# Patient Record
Sex: MALE | Race: WHITE | Employment: OTHER | URBAN - METROPOLITAN AREA
[De-identification: names, ages, dates, MRNs, and addresses within clinical notes are randomized per-mention and may not be internally consistent; named-entity substitution may affect disease eponyms.]

---

## 2023-10-25 PROBLEM — H04.123 BILATERAL DRY EYES: Status: ACTIVE | Noted: 2023-10-25

## 2023-10-25 PROBLEM — H16.223 KERATITIS SICCA, BILATERAL: Status: ACTIVE | Noted: 2023-10-25

## 2023-10-25 PROBLEM — H00.023 INTERNAL HORDEOLUM OF RIGHT EYE: Status: ACTIVE | Noted: 2023-10-25

## 2023-10-25 PROBLEM — H25.13 NUCLEAR SCLEROSIS OF BOTH EYES: Status: ACTIVE | Noted: 2023-10-25

## 2023-10-25 PROBLEM — H00.022 HORDEOLUM INTERNUM OF RIGHT LOWER EYELID: Status: ACTIVE | Noted: 2023-10-25

## 2023-10-25 PROBLEM — H25.013 CORTICAL AGE-RELATED CATARACT OF BOTH EYES: Status: ACTIVE | Noted: 2023-10-25

## 2023-10-25 PROBLEM — H02.831 DERMATOCHALASIS OF RIGHT UPPER EYELID: Status: ACTIVE | Noted: 2023-10-25

## 2023-10-25 PROBLEM — M35.01 KERATITIS SICCA, BILATERAL (MULTI): Status: ACTIVE | Noted: 2023-10-25

## 2023-10-25 PROBLEM — D31.31 CHOROIDAL NEVUS OF RIGHT EYE: Status: ACTIVE | Noted: 2023-10-25

## 2023-10-25 PROBLEM — H01.136: Status: ACTIVE | Noted: 2023-10-25

## 2023-10-25 RX ORDER — LOTEPREDNOL ETABONATE 5 MG/G
1 OINTMENT OPHTHALMIC NIGHTLY
COMMUNITY
Start: 2021-12-03

## 2023-10-25 RX ORDER — LISINOPRIL 20 MG/1
20 TABLET ORAL
COMMUNITY

## 2023-10-25 RX ORDER — DOXAZOSIN 4 MG/1
4 TABLET ORAL
COMMUNITY

## 2023-10-25 RX ORDER — CYCLOSPORINE 0 G/ML
1 SOLUTION/ DROPS OPHTHALMIC; TOPICAL 2 TIMES DAILY
COMMUNITY
Start: 2022-08-25

## 2023-10-26 ENCOUNTER — OFFICE VISIT (OUTPATIENT)
Dept: OPHTHALMOLOGY | Facility: CLINIC | Age: 83
End: 2023-10-26
Payer: MEDICARE

## 2023-10-26 DIAGNOSIS — H01.00A BLEPHARITIS OF UPPER AND LOWER EYELIDS OF BOTH EYES, UNSPECIFIED TYPE: ICD-10-CM

## 2023-10-26 DIAGNOSIS — H25.13 NUCLEAR SCLEROSIS OF BOTH EYES: Primary | ICD-10-CM

## 2023-10-26 DIAGNOSIS — H01.136 ATOPIC DERMATITIS OF LEFT EYELID: ICD-10-CM

## 2023-10-26 DIAGNOSIS — D31.31 CHOROIDAL NEVUS OF RIGHT EYE: ICD-10-CM

## 2023-10-26 DIAGNOSIS — H52.4 HYPEROPIA OF BOTH EYES WITH ASTIGMATISM AND PRESBYOPIA: ICD-10-CM

## 2023-10-26 DIAGNOSIS — H04.123 BILATERAL DRY EYES: ICD-10-CM

## 2023-10-26 DIAGNOSIS — M35.01 KERATITIS SICCA, BILATERAL (MULTI): ICD-10-CM

## 2023-10-26 DIAGNOSIS — H25.013 CORTICAL AGE-RELATED CATARACT OF BOTH EYES: ICD-10-CM

## 2023-10-26 DIAGNOSIS — H02.831 DERMATOCHALASIS OF RIGHT UPPER EYELID: ICD-10-CM

## 2023-10-26 DIAGNOSIS — H52.203 HYPEROPIA OF BOTH EYES WITH ASTIGMATISM AND PRESBYOPIA: ICD-10-CM

## 2023-10-26 DIAGNOSIS — H01.00B BLEPHARITIS OF UPPER AND LOWER EYELIDS OF BOTH EYES, UNSPECIFIED TYPE: ICD-10-CM

## 2023-10-26 DIAGNOSIS — H52.03 HYPEROPIA OF BOTH EYES WITH ASTIGMATISM AND PRESBYOPIA: ICD-10-CM

## 2023-10-26 PROCEDURE — 83861 MICROFLUID ANALY TEARS: CPT | Performed by: OPTOMETRIST

## 2023-10-26 PROCEDURE — 83516 IMMUNOASSAY NONANTIBODY: CPT | Performed by: OPTOMETRIST

## 2023-10-26 PROCEDURE — 92014 COMPRE OPH EXAM EST PT 1/>: CPT | Performed by: OPTOMETRIST

## 2023-10-26 PROCEDURE — 92015 DETERMINE REFRACTIVE STATE: CPT | Performed by: OPTOMETRIST

## 2023-10-26 RX ORDER — PERFLUOROHEXYLOCTANE 1 MG/MG
1 SOLUTION OPHTHALMIC 4 TIMES DAILY
Qty: 3 ML | Refills: 11 | Status: SHIPPED | OUTPATIENT
Start: 2023-10-26 | End: 2024-10-25

## 2023-10-26 ASSESSMENT — CONF VISUAL FIELD
OS_NORMAL: 1
OD_NORMAL: 1
OS_SUPERIOR_NASAL_RESTRICTION: 0
OS_SUPERIOR_TEMPORAL_RESTRICTION: 0
OD_SUPERIOR_NASAL_RESTRICTION: 0
OD_INFERIOR_NASAL_RESTRICTION: 0
OS_INFERIOR_NASAL_RESTRICTION: 0
OD_INFERIOR_TEMPORAL_RESTRICTION: 0
OS_INFERIOR_TEMPORAL_RESTRICTION: 0
OD_SUPERIOR_TEMPORAL_RESTRICTION: 0

## 2023-10-26 ASSESSMENT — ENCOUNTER SYMPTOMS
GASTROINTESTINAL NEGATIVE: 0
ENDOCRINE NEGATIVE: 0
MUSCULOSKELETAL NEGATIVE: 0
CONSTITUTIONAL NEGATIVE: 0
CARDIOVASCULAR NEGATIVE: 0
EYES NEGATIVE: 0
NEUROLOGICAL NEGATIVE: 0
HEMATOLOGIC/LYMPHATIC NEGATIVE: 0
PSYCHIATRIC NEGATIVE: 0
RESPIRATORY NEGATIVE: 0
ALLERGIC/IMMUNOLOGIC NEGATIVE: 0

## 2023-10-26 ASSESSMENT — REFRACTION_MANIFEST
OS_SPHERE: +0.75
OS_ADD: +2.50
OS_CYLINDER: SPHERE
OD_AXIS: 075
OD_SPHERE: +1.00
OD_CYLINDER: -0.75
OD_ADD: +2.50

## 2023-10-26 ASSESSMENT — REFRACTION_WEARINGRX
OS_ADD: +2.50
OS_CYLINDER: SPHERE
OD_ADD: +2.50
OS_SPHERE: +1.00
OD_SPHERE: +0.75
OD_AXIS: 074
OD_CYLINDER: -0.50

## 2023-10-26 ASSESSMENT — VISUAL ACUITY
OS_CC: 20/25
METHOD: SNELLEN - LINEAR
CORRECTION_TYPE: GLASSES
OD_CC: 20/30

## 2023-10-26 ASSESSMENT — EXTERNAL EXAM - LEFT EYE: OS_EXAM: NORMAL

## 2023-10-26 ASSESSMENT — TONOMETRY
IOP_METHOD: GOLDMANN APPLANATION
OS_IOP_MMHG: 19
OD_IOP_MMHG: 16

## 2023-10-26 ASSESSMENT — CUP TO DISC RATIO
OS_RATIO: 0.3
OD_RATIO: 0.3

## 2023-10-26 ASSESSMENT — EXTERNAL EXAM - RIGHT EYE: OD_EXAM: NORMAL

## 2023-10-26 NOTE — PROGRESS NOTES
Testing to evaluate the presence of dry eye disease (DED) was performed today and provided the following results:  The ocular surface disease index questionnaire (OSDI) score was  (scale: 0-12 = normal, 13-22 = mild, 23-32 = moderate, >33 = severe):  21 was 25 was 12  TearLab, a test for tear film osmolarity revealed (normal <300 mOsm/L, mild 300-320, moderate 320-340, severe >340 mOsm/L, inter eye difference of >8 mOsm/L is considered abnormal as well)  Not done today  Inflammadry, a test for the DED specific MMP-9 inhibitor:  OD:  strong positive was strong positive  OS:  strong positive was positive  Schirmer`s test with anesthetic, testing basal tear production (0-5 = severe, 6-10 = moderate, 11-15 = mild):  OD: 15 was 12 was 15 mm between 1-6 minutes  OS:  16 was 5 was 10 mm between 1-6 minutes  Tear break up time (TBUT), a measure of tear stability (0-5 = severe, 6-10 = moderate, 11-15 = mild)  OD: 10 was 10  seconds (excess tearing)  OS:   10 was 10 seconds  except over elevated spot on cornea  excess tearing  Corneal punctate epithelial erosions (PEE) staining with sodium fluorescein score (5 zones, each PEE level 0-3, maximum score = 15)   OD: NIH PEE score:  0 Central PEE absent  OS: NIH PEE score:  0 Central PEE absent  Meibomian gland dysfunction (Efron scale: 0 to 4 with 0 indicating fully functional Meibomian glands and 4 indicating the severest level of plugged glands as well as eyelid margin telangiectasia/erythema):  OD: 2-3 with  atopic thickening  OS: 2-3 with atopic thickening    Has DC`d Restasis and Cequa due to burning but now has reflex tearing.  Pt recommended to resume.  Using Lotemax lucy PRN, WC`s BID.  Rx for Lotemax lucy sent to [facility].  CN OD is stable.  NS and ACC stable.  VA 20/25 OD and OS and midly reduced due to cataracts.  Hx of OCT RNFL and normal. ptosis OD (pt exhibits severe brow rescue.  Pt advised that droopy lid is not due to inflammation.  Skin looks excellent.  Minor  atopic wrinkling and no flaking.  Small neoplasm skin left lower lid.  Nevus per Dr Venegas did not feel biopsy/remove is needed.  No need for blepharoplasty at this point.   Start Miebo QID. Rx sent in.   1 year for TS for DFE and MR and dry eye testing.

## 2024-12-10 ENCOUNTER — DOCUMENTATION (OUTPATIENT)
Dept: OPHTHALMOLOGY | Facility: CLINIC | Age: 84
End: 2024-12-10
Payer: MEDICARE

## 2024-12-10 DIAGNOSIS — H16.223 KERATITIS SICCA, BILATERAL: Primary | ICD-10-CM

## 2024-12-10 RX ORDER — PERFLUOROHEXYLOCTANE 1 MG/MG
1 SOLUTION OPHTHALMIC 4 TIMES DAILY
Qty: 3 ML | Refills: 11 | Status: SHIPPED | OUTPATIENT
Start: 2024-12-10 | End: 2025-01-09

## 2025-02-21 ENCOUNTER — APPOINTMENT (OUTPATIENT)
Dept: OPHTHALMOLOGY | Age: 85
End: 2025-02-21
Payer: MEDICARE

## 2025-04-18 ENCOUNTER — APPOINTMENT (OUTPATIENT)
Dept: OPHTHALMOLOGY | Age: 85
End: 2025-04-18
Payer: MEDICARE

## 2025-04-18 DIAGNOSIS — H52.03 HYPEROPIA OF BOTH EYES WITH ASTIGMATISM AND PRESBYOPIA: ICD-10-CM

## 2025-04-18 DIAGNOSIS — H01.00B BLEPHARITIS OF UPPER AND LOWER EYELIDS OF BOTH EYES, UNSPECIFIED TYPE: ICD-10-CM

## 2025-04-18 DIAGNOSIS — H16.223 KERATITIS SICCA, BILATERAL: ICD-10-CM

## 2025-04-18 DIAGNOSIS — H01.00A BLEPHARITIS OF UPPER AND LOWER EYELIDS OF BOTH EYES, UNSPECIFIED TYPE: ICD-10-CM

## 2025-04-18 DIAGNOSIS — H52.203 HYPEROPIA OF BOTH EYES WITH ASTIGMATISM AND PRESBYOPIA: ICD-10-CM

## 2025-04-18 DIAGNOSIS — D31.31 CHOROIDAL NEVUS OF RIGHT EYE: ICD-10-CM

## 2025-04-18 DIAGNOSIS — H25.13 NUCLEAR SCLEROSIS OF BOTH EYES: Primary | ICD-10-CM

## 2025-04-18 DIAGNOSIS — H52.4 HYPEROPIA OF BOTH EYES WITH ASTIGMATISM AND PRESBYOPIA: ICD-10-CM

## 2025-04-18 DIAGNOSIS — H04.123 BILATERAL DRY EYES: ICD-10-CM

## 2025-04-18 RX ORDER — FINASTERIDE 5 MG/1
5 TABLET, FILM COATED ORAL
COMMUNITY
Start: 2025-02-05

## 2025-04-18 RX ORDER — PERFLUOROHEXYLOCTANE 1 MG/MG
1 SOLUTION OPHTHALMIC 4 TIMES DAILY
COMMUNITY
Start: 2024-01-07

## 2025-04-18 RX ORDER — SENNOSIDES 8.6 MG/1
1 TABLET ORAL NIGHTLY
COMMUNITY
Start: 2024-07-23

## 2025-04-18 RX ORDER — ASPIRIN 325 MG
325 TABLET, DELAYED RELEASE (ENTERIC COATED) ORAL
COMMUNITY

## 2025-04-18 RX ORDER — NEOMYCIN SULFATE, POLYMYXIN B SULFATE, AND DEXAMETHASONE 3.5; 10000; 1 MG/G; [USP'U]/G; MG/G
OINTMENT OPHTHALMIC
COMMUNITY
Start: 2024-04-23

## 2025-04-18 RX ORDER — VIT C/E/ZN/COPPR/LUTEIN/ZEAXAN 250MG-90MG
CAPSULE ORAL
COMMUNITY

## 2025-04-18 RX ORDER — HYDROCHLOROTHIAZIDE 12.5 MG/1
12.5 CAPSULE ORAL
COMMUNITY
Start: 2024-12-03

## 2025-04-18 ASSESSMENT — REFRACTION_MANIFEST
OS_SPHERE: NO TARGET
OD_AXIS: 082
OD_ADD: +2.50
OS_CYLINDER: -0.25
OD_SPHERE: -0.75
METHOD_AUTOREFRACTION: 1
OS_AXIS: 040
OS_CYLINDER: SPHERE
OD_CYLINDER: -1.00
OS_SPHERE: +1.50
OS_ADD: +2.50
OD_AXIS: 070
OD_SPHERE: -1.25
OD_CYLINDER: -0.50

## 2025-04-18 ASSESSMENT — ENCOUNTER SYMPTOMS
CONSTITUTIONAL NEGATIVE: 0
ENDOCRINE NEGATIVE: 0
MUSCULOSKELETAL NEGATIVE: 0
GASTROINTESTINAL NEGATIVE: 0
RESPIRATORY NEGATIVE: 0
HEMATOLOGIC/LYMPHATIC NEGATIVE: 0
NEUROLOGICAL NEGATIVE: 0
CARDIOVASCULAR NEGATIVE: 0
EYES NEGATIVE: 1
ALLERGIC/IMMUNOLOGIC NEGATIVE: 0
PSYCHIATRIC NEGATIVE: 0

## 2025-04-18 ASSESSMENT — VISUAL ACUITY
METHOD: SNELLEN - LINEAR
OD_CC+: -1
OD_CC: 20/70
CORRECTION_TYPE: GLASSES
OS_CC+: +1
OS_BAT_HIGH: 20/100
OS_CC: 20/70
OD_BAT_HIGH: 20/100

## 2025-04-18 ASSESSMENT — CONF VISUAL FIELD
OD_INFERIOR_NASAL_RESTRICTION: 0
OD_NORMAL: 1
OD_SUPERIOR_NASAL_RESTRICTION: 0
OD_SUPERIOR_TEMPORAL_RESTRICTION: 0
OS_INFERIOR_NASAL_RESTRICTION: 0
OS_SUPERIOR_TEMPORAL_RESTRICTION: 0
OS_NORMAL: 1
OD_INFERIOR_TEMPORAL_RESTRICTION: 0
OS_SUPERIOR_NASAL_RESTRICTION: 0
OS_INFERIOR_TEMPORAL_RESTRICTION: 0
METHOD: COUNTING FINGERS

## 2025-04-18 ASSESSMENT — SCHIRMERS TEST
OD_SCHIRMERS: 10
OS_SCHIRMERS: 9

## 2025-04-18 ASSESSMENT — REFRACTION_WEARINGRX
OD_CYLINDER: -0.75
OD_ADD: +2.50
OD_SPHERE: +1.00
OD_AXIS: 075
OS_CYLINDER: SPHERE
OS_ADD: +2.50
OS_SPHERE: +0.75

## 2025-04-18 ASSESSMENT — TONOMETRY
OS_IOP_MMHG: 17
IOP_METHOD: GOLDMANN APPLANATION
OD_IOP_MMHG: 16

## 2025-04-18 ASSESSMENT — EXTERNAL EXAM - LEFT EYE: OS_EXAM: NORMAL

## 2025-04-18 ASSESSMENT — EXTERNAL EXAM - RIGHT EYE: OD_EXAM: NORMAL

## 2025-04-18 ASSESSMENT — CUP TO DISC RATIO
OD_RATIO: 0.3
OS_RATIO: 0.3

## 2025-04-18 NOTE — PROGRESS NOTES
Testing to evaluate the presence of dry eye disease (DED) was performed today and provided the following results:  The ocular surface disease index questionnaire (OSDI) score was  (scale: 0-12 = normal, 13-22 = mild, 23-32 = moderate, >33 = severe):  42 was 21 was 25 was 12  TearLab, a test for tear film osmolarity revealed (normal <300 mOsm/L, mild 300-320, moderate 320-340, severe >340 mOsm/L, inter eye difference of >8 mOsm/L is considered abnormal as well)       Inflammadry, a test for the DED specific MMP-9 inhibitor:  OD:  pos was strong positive was strong positive  OS:  pos was strong positive was positive  Schirmer`s test with anesthetic, testing basal tear production (0-5 = severe, 6-10 = moderate, 11-15 = mild):  OD: 10 was 15 was 12 was 15 mm between 1-6 minutes  OS:  10 was 16 was 5 was 10 mm between 1-6 minutes  Tear break up time (TBUT), a measure of tear stability (0-5 = severe, 6-10 = moderate, 11-15 = mild)  OD: 10 was 10  seconds (excess tearing)  OS:   10 was 10 seconds  except over elevated spot on cornea  excess tearing  Corneal punctate epithelial erosions (PEE) staining with sodium fluorescein score (5 zones, each PEE level 0-3, maximum score = 15)   OD: NIH PEE score:  0 Central PEE absent  OS: NIH PEE score:  0 Central PEE absent  Meibomian gland dysfunction (Efron scale: 0 to 4 with 0 indicating fully functional Meibomian glands and 4 indicating the severest level of plugged glands as well as eyelid margin telangiectasia/erythema):  OD: 2-3 with  atopic thickening  OS: 2-3 with atopic thickening    Blepharitis: Meibomain gland disease (Effron scale 0-4, 0:no abnormality, 4: thick creamy yellow expression at all gland orifices, expression continuous, conjunctival redness). Collarettes (0-4, 0: 0-2 lashes with collarettes, 1: 3-10, 2: >10 but <1/3rd of lashes, 3: >1/3rd to <2/3rd of lashes, 4: >2/3rd of lashes.  Right eye (OD): stage 2 collarettes: stage 0  Left eye (OS): stage 2  collarettes: stage 0     Has DC`d Restasis and Cequa due to burning but now has reflex tearing.  Pt recommended to resume.  Using Lotemax lucy PRN, WC`s BID.  Rx for Lotemax lucy sent to [facility].  CN OD is stable.  NS and ACC stable.  VA 20/25 OD and OS and midly reduced due to cataracts.  Hx of OCT RNFL and normal. ptosis OD (pt exhibits severe brow rescue.  Pt advised that droopy lid is not due to inflammation.  Skin looks excellent.  Minor atopic wrinkling and no flaking.  Small neoplasm skin left lower lid.  Nevus per Dr Venegas did not feel biopsy/remove is needed.  No need for blepharoplasty at this point.     Meibo TID OU and OTC AFTs PRN. Notes frequent irritation of eyelids and surrounding skin. Has a steroid cream prescribed by his dermatologist that he uses once per week. Has Lotemax gel gtts that he uses as needed, states he uses very infrequently. Does not perform warm compresses or eyelid hygiene because it irritates the skin    Cataracts OU. BCVA 20/50 OD/OS, BAT 20/100 OD/OS.     1 year for full exam with TS. Deferred spectacle Rx today as VA can not be corrected with new glasses.    Continue current dry eye regimen (Miebo PFATs and steroid lucy for eyelid skin) NOTE: no demodex blepharitis no collarettes notd. Referred to Dr Weeks for cataract pre-op testing.

## 2025-04-28 ENCOUNTER — APPOINTMENT (OUTPATIENT)
Dept: OPHTHALMOLOGY | Age: 85
End: 2025-04-28
Payer: MEDICARE

## 2025-04-28 ENCOUNTER — PREP FOR PROCEDURE (OUTPATIENT)
Dept: OPHTHALMOLOGY | Facility: CLINIC | Age: 85
End: 2025-04-28

## 2025-04-28 DIAGNOSIS — H25.811 COMBINED FORM OF AGE-RELATED CATARACT, RIGHT EYE: Primary | ICD-10-CM

## 2025-04-28 DIAGNOSIS — H25.13 NUCLEAR SCLEROSIS OF BOTH EYES: Primary | ICD-10-CM

## 2025-04-28 DIAGNOSIS — H52.213 IRREGULAR ASTIGMATISM, BILATERAL: ICD-10-CM

## 2025-04-28 DIAGNOSIS — H25.812 COMBINED FORM OF AGE-RELATED CATARACT, LEFT EYE: ICD-10-CM

## 2025-04-28 RX ORDER — TETRACAINE HYDROCHLORIDE 5 MG/ML
1 SOLUTION OPHTHALMIC ONCE
OUTPATIENT
Start: 2025-04-28 | End: 2025-04-28

## 2025-04-28 RX ORDER — CYCLOPENTOLATE HYDROCHLORIDE 10 MG/ML
1 SOLUTION/ DROPS OPHTHALMIC
OUTPATIENT
Start: 2025-04-28 | End: 2025-04-28

## 2025-04-28 RX ORDER — PHENYLEPHRINE HYDROCHLORIDE 25 MG/ML
1 SOLUTION/ DROPS OPHTHALMIC
OUTPATIENT
Start: 2025-04-28 | End: 2025-04-28

## 2025-04-28 RX ORDER — TROPICAMIDE 10 MG/ML
1 SOLUTION/ DROPS OPHTHALMIC
OUTPATIENT
Start: 2025-04-28 | End: 2025-04-28

## 2025-04-28 ASSESSMENT — CONF VISUAL FIELD
OD_SUPERIOR_NASAL_RESTRICTION: 0
OD_NORMAL: 1
OS_SUPERIOR_TEMPORAL_RESTRICTION: 0
OS_INFERIOR_TEMPORAL_RESTRICTION: 0
OD_SUPERIOR_TEMPORAL_RESTRICTION: 0
OS_NORMAL: 1
OS_INFERIOR_NASAL_RESTRICTION: 0
OS_SUPERIOR_NASAL_RESTRICTION: 0
OD_INFERIOR_TEMPORAL_RESTRICTION: 0
OD_INFERIOR_NASAL_RESTRICTION: 0

## 2025-04-28 ASSESSMENT — CUP TO DISC RATIO
OS_RATIO: 0.3
OD_RATIO: 0.3

## 2025-04-28 ASSESSMENT — REFRACTION_WEARINGRX
OD_AXIS: 075
OS_CYLINDER: SPHERE
OD_ADD: +2.50
OS_SPHERE: +0.75
OD_SPHERE: +1.00
OS_ADD: +2.50
OD_CYLINDER: -0.75

## 2025-04-28 ASSESSMENT — ENCOUNTER SYMPTOMS
PSYCHIATRIC NEGATIVE: 0
MUSCULOSKELETAL NEGATIVE: 0
GASTROINTESTINAL NEGATIVE: 0
ENDOCRINE NEGATIVE: 0
ALLERGIC/IMMUNOLOGIC NEGATIVE: 0
HEMATOLOGIC/LYMPHATIC NEGATIVE: 0
EYES NEGATIVE: 1
RESPIRATORY NEGATIVE: 0
CARDIOVASCULAR NEGATIVE: 0
CONSTITUTIONAL NEGATIVE: 0
NEUROLOGICAL NEGATIVE: 0

## 2025-04-28 ASSESSMENT — VISUAL ACUITY
METHOD: SNELLEN - LINEAR
CORRECTION_TYPE: GLASSES
OS_PH_CC: 20/50
OD_PH_CC: 20/50
OD_BAT_MED: 20/100
OS_CC: 20/70
OD_PH_CC+: -1
OD_CC: 20/70
OS_BAT_MED: 20/100

## 2025-04-28 ASSESSMENT — REFRACTION_MANIFEST
OS_AXIS: 040
OD_AXIS: 070
OS_CYLINDER: -0.25
OS_ADD: +2.50
OD_SPHERE: -1.25
OD_CYLINDER: -1.00
OS_SPHERE: +1.50
OD_ADD: +2.50

## 2025-04-28 ASSESSMENT — EXTERNAL EXAM - LEFT EYE: OS_EXAM: NORMAL

## 2025-04-28 ASSESSMENT — TONOMETRY
IOP_METHOD: GOLDMANN APPLANATION
OD_IOP_MMHG: 16
OS_IOP_MMHG: 17

## 2025-04-28 ASSESSMENT — EXTERNAL EXAM - RIGHT EYE: OD_EXAM: NORMAL

## 2025-04-28 NOTE — PROGRESS NOTES
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 Pt would like to proceed with surgery.    Visually significant cataract OD. BCVA: 20/50. Glare: 20/100. Symptoms: blurry vision, glare. A change in glasses prescription will not result in significant visual improvement at this time.  Indication for cataract surgery: Input To potentially improve visual acuity and improve quality of life/reduce symptoms.   Based on a comprehensive eye exam performed today, a visually significant cataract appears to be the source of decreased vision, diminished quality of life, and impairment of activities of daily living. Discussed option of cataract surgery vs observation. Patient can no longer function adequately with current best corrected visual acuity and wishes to have cataract surgery at this time. Discussed surgical procedure with patient. As a result of cataract extraction, it is believed that the patient will experience improved vision. Discussed potential risks, benefits, and complications of cataract surgery including but not limited to pain, bleeding, infection, inflammation, edema, increased eye pressure, retinal tear/detachment, lens dislocation, ptosis, iris damage, need for additional surgery, need for glasses after surgery, loss of vision/loss of eye. Patient understands and wishes to proceed. All questions were answered. Will schedule cataract surgery OD. Lenstar done today.  Discussed IOL options (standard monofocal, monofocal with monovision, toric, multifocal). Lens chosen: standard monofocal. Defer/decline toric/multifocal lens at this time. Had thorough discussion with patient re: aim. Discussed that may potentially need glasses for best vision both at distance and at near.    Schedule cataract surgery OD  I personally reviewed the lenstar measurements  and will choose the lens accordingly.     Combined form of age-related cataract, left eyeH25.812  Visually significant. Pt would like to proceed with surgery.    Visually significant cataract OS. BCVA: 20/50. Glare: 20/100. Symptoms: blurry vision, glare. A change in glasses prescription will not result in significant visual improvement at this time.  Indication for cataract surgery: Input To potentially improve visual acuity and improve quality of life/reduce symptoms.   Based on a comprehensive eye exam performed today, a visually significant cataract appears to be the source of decreased vision, diminished quality of life, and impairment of activities of daily living. Discussed option of cataract surgery vs observation. Patient can no longer function adequately with current best corrected visual acuity and wishes to have cataract surgery at this time. Discussed surgical procedure with patient. As a result of cataract extraction, it is believed that the patient will experience improved vision. Discussed potential risks, benefits, and complications of cataract surgery including but not limited to pain, bleeding, infection, inflammation, edema, increased eye pressure, retinal tear/detachment, lens dislocation, ptosis, iris damage, need for additional surgery, need for glasses after surgery, loss of vision/loss of eye. Patient understands and wishes to proceed. All questions were answered. Will schedule cataract surgery OS. Lenstar done today.   Discussed IOL options (standard monofocal, monofocal with monovision, toric, multifocal). Lens chosen: standard monofocal. Defer/decline toric/multifocal lens at this time. Had thorough discussion with patient re: aim. Discussed that may potentially need glasses for best vision both at distance and at near.     Schedule cataract surgery OS  I personally reviewed the lenstar measurements and will choose the lens accordingly.

## 2025-04-28 NOTE — H&P
History Of Present Illness  Renato Griffiths is a 84 y.o. male presenting with blurred vision.     Past Medical History  He has no past medical history on file.    Surgical History  He has no past surgical history on file.     Social History  He has no history on file for tobacco use, alcohol use, and drug use.     Allergies  Patient has no known allergies.    ROS  Patient denies ocular pain, redness, discharge, decreased vision, double vision, blind spots, flashes, or floaters.     Physical Exam  Not recorded          Assessment/Plan   Diagnoses and all orders for this visit:  Combined form of age-related cataract, right eye  -     Case Request Operating Room: Cataract extraction with intraocular lens implantation; Standing  Combined form of age-related cataract, left eye  -     Case Request Operating Room: Cataract extraction with intraocular lens implantation; Standing  Other orders  -     Place in outpatient/hospital ambulatory surgery; Standing  -     Full code; Standing  -     NPO Diet Except: Sips with meds; Effective now; Standing  -     Height and weight; Standing  -     Insert and maintain peripheral IV; Standing  -     Saline lock IV; Standing  -     Type And Screen; Standing  -     tetracaine (Altacaine) 0.5 % ophthalmic solution 1 drop  -     cyclopentolate (Cyclogyl) 1 % ophthalmic solution 1 drop  -     phenylephrine (Mydfrin) 2.5 % ophthalmic solution 1 drop  -     tropicamide (Mydriacyl) 1 % ophthalmic solution 1 drop  -     Full code; Standing  -     NPO Diet Except: Sips with meds; Effective now; Standing  -     Height and weight; Standing  -     Insert and maintain peripheral IV; Standing  -     Saline lock IV; Standing  -     Type And Screen; Standing  -     tetracaine (Altacaine) 0.5 % ophthalmic solution 1 drop  -     cyclopentolate (Cyclogyl) 1 % ophthalmic solution 1 drop  -     phenylephrine (Mydfrin) 2.5 % ophthalmic solution 1 drop  -     tropicamide (Mydriacyl) 1 % ophthalmic solution 1  drop           I spent 30 minutes in the professional and overall care of this patient.      Pat Weeks MD

## 2025-07-02 ENCOUNTER — APPOINTMENT (OUTPATIENT)
Dept: OPHTHALMOLOGY | Facility: CLINIC | Age: 85
End: 2025-07-02
Payer: MEDICARE

## 2025-07-16 ENCOUNTER — PRE-ADMISSION TESTING (OUTPATIENT)
Dept: PREADMISSION TESTING | Facility: HOSPITAL | Age: 85
End: 2025-07-16
Payer: MEDICARE

## 2025-07-16 VITALS
RESPIRATION RATE: 18 BRPM | DIASTOLIC BLOOD PRESSURE: 71 MMHG | SYSTOLIC BLOOD PRESSURE: 156 MMHG | TEMPERATURE: 95.7 F | HEIGHT: 69 IN | BODY MASS INDEX: 26.78 KG/M2 | OXYGEN SATURATION: 98 % | WEIGHT: 180.78 LBS | HEART RATE: 71 BPM

## 2025-07-16 DIAGNOSIS — I10 PRIMARY HYPERTENSION: ICD-10-CM

## 2025-07-16 DIAGNOSIS — Z01.818 ENCOUNTER FOR PREADMISSION TESTING: Primary | ICD-10-CM

## 2025-07-16 DIAGNOSIS — N40.0 BENIGN PROSTATIC HYPERPLASIA, UNSPECIFIED WHETHER LOWER URINARY TRACT SYMPTOMS PRESENT: ICD-10-CM

## 2025-07-16 DIAGNOSIS — H25.813 COMBINED FORM OF AGE-RELATED CATARACT, BOTH EYES: ICD-10-CM

## 2025-07-16 PROCEDURE — 99204 OFFICE O/P NEW MOD 45 MIN: CPT | Performed by: NURSE PRACTITIONER

## 2025-07-16 ASSESSMENT — DUKE ACTIVITY SCORE INDEX (DASI)
CAN YOU DO MODERATE WORK AROUND THE HOUSE LIKE VACUUMING, SWEEPING FLOORS OR CARRYING GROCERIES: YES
CAN YOU DO LIGHT WORK AROUND THE HOUSE LIKE DUSTING OR WASHING DISHES: YES
CAN YOU CLIMB A FLIGHT OF STAIRS OR WALK UP A HILL: YES
TOTAL_SCORE: 37.45
CAN YOU PARTICIPATE IN STRENOUS SPORTS LIKE SWIMMING, SINGLES TENNIS, FOOTBALL, BASKETBALL, OR SKIING: NO
CAN YOU HAVE SEXUAL RELATIONS: NO
CAN YOU WALK INDOORS, SUCH AS AROUND YOUR HOUSE: YES
CAN YOU RUN A SHORT DISTANCE: NO
CAN YOU TAKE CARE OF YOURSELF (EAT, DRESS, BATHE, OR USE TOILET): YES
CAN YOU WALK A BLOCK OR TWO ON LEVEL GROUND: YES
CAN YOU PARTICIPATE IN MODERATE RECREATIONAL ACTIVITIES LIKE GOLF, BOWLING, DANCING, DOUBLES TENNIS OR THROWING A BASEBALL OR FOOTBALL: YES
CAN YOU DO YARD WORK LIKE RAKING LEAVES, WEEDING OR PUSHING A MOWER: YES
DASI METS SCORE: 7.3
CAN YOU DO HEAVY WORK AROUND THE HOUSE LIKE SCRUBBING FLOORS OR LIFTING AND MOVING HEAVY FURNITURE: YES

## 2025-07-16 ASSESSMENT — PAIN SCALES - GENERAL: PAINLEVEL_OUTOF10: 0 - NO PAIN

## 2025-07-16 ASSESSMENT — PAIN - FUNCTIONAL ASSESSMENT: PAIN_FUNCTIONAL_ASSESSMENT: 0-10

## 2025-07-16 NOTE — PREPROCEDURE INSTRUCTIONS
Medication List            Accurate as of July 16, 2025 12:05 PM. Always use your most recent med list.                aspirin 325 mg EC tablet  Medication Adjustments for Surgery: Take/Use as prescribed     Cequa 0.09 % dropperette  Generic drug: cycloSPORINE  Medication Adjustments for Surgery: Take/Use as prescribed     cyanocobalamin (vitamin B-12) 2,500 mcg tablet, sublingual SL tablet  Medication Adjustments for Surgery: Take/Use as prescribed     doxazosin 4 mg tablet  Commonly known as: Cardura  Medication Adjustments for Surgery: Take/Use as prescribed     finasteride 5 mg tablet  Commonly known as: Proscar  Medication Adjustments for Surgery: Take/Use as prescribed     hydroCHLOROthiazide 12.5 mg capsule  Commonly known as: Microzide  Medication Adjustments for Surgery: Take/Use as prescribed     lisinopril 20 mg tablet  Medication Adjustments for Surgery: Take last dose 1 day (24 hours) before surgery     Lotemax 0.5 % ointment  Generic drug: loteprednol etabonate     Miebo (PF) 100 % drops  Generic drug: perfluorohexyloctane (PF)     neomycin-polymyxin B-dexameth 3.5 mg/g-10,000 unit/g-0.1 % ointment ophthalmic ointment  Commonly known as: Polydex     sennosides 8.6 mg tablet  Commonly known as: Senokot  Medication Adjustments for Surgery: Take/Use as prescribed                              NPO Instructions:    Do not eat any food after midnight the night before your surgery/procedure.    Additional Instructions:     Day of Surgery:  You may have clear liquids until TWO hours before surgery/procedure.  This includes water, black tea/coffee, (no milk or cream) apple juice and electrolyte drinks (Gatorade)  Wear  comfortable loose fitting clothing  Do not use moisturizers, creams, lotions or perfume  All jewelry and valuables should be left at home      PRE-OPERATIVE INSTRUCTIONS FOR SURGERY    *Do not eat anything after midnight the night of surgery.  This includes food of any kind (including hard  candy, cough drops, mints).     You may have up to 13 ounces of clear liquid until TWO hours prior to your scheduled arrival time  Clear liquids include water, black tea/coffee, (no milk or cream) apple juice and electrolyte drinks (GATORADE).  You may chew gum until TWO hours prior you your surgery/procedure.     *One of our staff members will call you ONE business day before  your surgery, between 11am-2 pm to let you know the time to arrive.  If you have not received a call by 2 pm, call [phone] This is the surgery schedulers that will call you with your arrival time    *When you arrive at the hospital-->GO TO Registration on the ground floor  *Stop smoking 24 hours prior to surgery.  No Marijuana, CBD Oil or Vaping for 48 hours  *No alcohol 24 hours prior to surgery  *You will need a responsible adult to drive you home  -No acrylic nails or nail polish on at least one fingernail, NO polish on toes for foot surgery  -You may be asked to remove your dentures, partial plate, eyeglasses or contact lenses before going to surgery.  Please bring a case for these items.  -Body piercings need to be removed.  Jewelry and valuables should be left at home.  -Put on loose,  comfortable, clean clothing, that will accommodate bandages    *If you have any further questions about your pre-op instructions,  not mentioned in this handout, then call [phone]* This is the nurse line

## 2025-07-16 NOTE — CPM/PAT H&P
CPM/PAT Evaluation       Name: Renato Griffiths (Renato Griffiths)  /Age: 1940/84 y.o.     In-Person       Chief Complaint: PAT for planned cataract surgery    84 yr old male w/PHx of HTN, BPH and combined form of age-related cataract of both eyes referred to PAT for planned Right cataract extraction w/intraocular lens implantation w/Dr Weeks on 2025 and Left cataract extraction w/intraocular lens implantation w/Dr Weeks on 2025     Patient reports feeling overall well, denies fever, cough or recent infection. Denies hx of stroke, seizure or blood clot.  Reports remaining otherwise physically active enjoying hiking and line dancing; denies cardiac or respiratory symptoms. Past surgical hx includes cystoscopy and colonoscopy many years ago; denies past issues with anesthesia.      Followed by PCP (Annel Shea MD) 2025  Followed by urology (Jonathan Méndez MD) 2025        Medical History[1]    Surgical History[2]    Patient Sexual activity questions deferred to the physician.    Family History[3]    Allergies[4]    Prior to Admission medications   Medication Sig Start Date End Date Taking? Authorizing Provider   aspirin 325 mg EC tablet Take 1 tablet (325 mg) by mouth.    Historical Provider, MD   cyanocobalamin, vitamin B-12, 2,500 mcg tablet, sublingual SL tablet Place under the tongue.    Historical Provider, MD   cycloSPORINE (Cequa) 0.09 % dropperette Administer 1 drop into affected eye(s) 2 times a day.  Patient not taking: Reported on 2025   Historical Provider, MD   doxazosin (Cardura) 4 mg tablet Take 1 tablet (4 mg) by mouth.    Historical Provider, MD   finasteride (Proscar) 5 mg tablet Take 1 tablet (5 mg) by mouth once daily. 25   Historical Provider, MD   hydroCHLOROthiazide (Microzide) 12.5 mg capsule Take 1 capsule (12.5 mg) by mouth once daily. 12/3/24   Historical Provider, MD   lisinopril 20 mg tablet Take 1 tablet (20 mg) by mouth.    Historical  "Provider, MD   loteprednol (Lotemax) 0.5 % ophthalmic gel APPLY 1 STRIP IN BOTH EYES AT BEDTIME 11/2/23   Miguel Garcia, YESSICA   loteprednol etabonate (Lotemax) 0.5 % ointment Apply 1 Application to affected eye(s) once daily at bedtime. 12/3/21   Historical Provider, MD Jon PF, 100 % drops Administer 1 drop into affected eye(s) 4 times a day. 1/7/24   Historical Provider, MD   neomycin-polymyxin B-dexameth (Polydex) 3.5 mg/g-10,000 unit/g-0.1 % ointment ophthalmic ointment APPLY THIN LAYER TO THE EYELID TWICE DAILY AS NEEDED FOR FLARES 4/23/24   Historical Provider, MD   sennosides (Senokot) 8.6 mg tablet Take 1 tablet (8.6 mg) by mouth once daily at bedtime. 7/23/24   Historical Provider, MD        A ten-point review of systems was completed and is otherwise negative except for what is mentioned in the HPI above.    Physical Exam  Vitals reviewed.   HENT:      Head: Normocephalic.     Eyes:      Pupils: Pupils are equal, round, and reactive to light.      Comments: Corrective lenses in use     Cardiovascular:      Rate and Rhythm: Normal rate and regular rhythm.      Pulses: Normal pulses.   Pulmonary:      Effort: Pulmonary effort is normal.      Breath sounds: Normal breath sounds.   Abdominal:      General: Bowel sounds are normal.     Musculoskeletal:         General: Normal range of motion.      Cervical back: Normal range of motion.     Skin:     General: Skin is warm and dry.     Neurological:      Mental Status: He is alert and oriented to person, place, and time.     Psychiatric:         Mood and Affect: Mood normal.          PAT AIRWAY:   Airway:     Mallampati::  I    TM distance::  >3 FB    Neck ROM::  Full  normal        Testing/Diagnostic: CMP    Patient Specialist/PCP: Annel Shea MD (PCP) 6/20/2025; Jonathan Méndez MD (urology) 6/19/2025    Visit Vitals  /71   Pulse 71   Temp 35.4 °C (95.7 °F) (Tympanic)   Resp 18   Ht 1.753 m (5' 9\")   Wt 82 kg (180 lb 12.4 oz)   SpO2 98%   BMI " 26.70 kg/m²   Smoking Status Former   BSA 2 m²       DASI Risk Score      Flowsheet Row Pre-Admission Testing from 7/16/2025 in [facility]   Can you take care of yourself (eat, dress, bathe, or use toilet)?  2.75 filed at 07/16/2025 1047   Can you walk indoors, such as around your house? 1.75 filed at 07/16/2025 1047   Can you walk a block or two on level ground?  2.75 filed at 07/16/2025 1047   Can you climb a flight of stairs or walk up a hill? 5.5 filed at 07/16/2025 1047   Can you run a short distance? 0 filed at 07/16/2025 1047   Can you do light work around the house like dusting or washing dishes? 2.7 filed at 07/16/2025 1047   Can you do moderate work around the house like vacuuming, sweeping floors or carrying groceries? 3.5 filed at 07/16/2025 1047   Can you do heavy work around the house like scrubbing floors or lifting and moving heavy furniture?  8 filed at 07/16/2025 1047   Can you do yard work like raking leaves, weeding or pushing a mower? 4.5 filed at 07/16/2025 1047   Can you have sexual relations? 0 filed at 07/16/2025 1047   Can you participate in moderate recreational activities like golf, bowling, dancing, doubles tennis or throwing a baseball or football? 6 filed at 07/16/2025 1047   Can you participate in strenous sports like swimming, singles tennis, football, basketball, or skiing? 0 filed at 07/16/2025 1047   DASI SCORE 37.45 filed at 07/16/2025 1047   METS Score (Will be calculated only when all the questions are answered) 7.3 filed at 07/16/2025 1047     Caprini DVT Assessment    No data to display  Modified Frailty Index    No data to display  [id number] Stroke Risk Points  Current as of just now        N/A 0 to 9 Points:      Last Change: N/A          The [id number] risk score (Lip YOHANA, et al. 2009. © 2010 [employer]) quantifies the risk of stroke for a patient with atrial fibrillation. For patients without atrial fibrillation or under the age  of 18 this score appears as N/A. Higher score values generally indicate higher risk of stroke.        This score is not applicable to this patient. Components are not calculated.          Revised Cardiac Risk Index    No data to display  Apfel Simplified Score    No data to display  Risk Analysis Index Results This Encounter    No data found in the last 10 encounters.       Stop Bang Score      Flowsheet Row Pre-Admission Testing from 7/16/2025 in [facility]   Do you snore loudly? 0 filed at 07/16/2025 1152   Do you often feel tired or fatigued after your sleep? 0 filed at 07/16/2025 1152   Has anyone ever observed you stop breathing in your sleep? 0 filed at 07/16/2025 1152   Do you have or are you being treated for high blood pressure? 1 filed at 07/16/2025 1152   Recent BMI (Calculated) 26.7 filed at 07/16/2025 1152   Is BMI greater than 35 kg/m2? 0=No filed at 07/16/2025 1152   Age older than 50 years old? 1=Yes filed at 07/16/2025 1152   Is your neck circumference greater than 17 inches (Male) or 16 inches (Female)? 0 filed at 07/16/2025 1152   Gender - Male 1=Yes filed at 07/16/2025 1152   STOP-BANG Total Score 3 filed at 07/16/2025 1152     Prodigy: High Risk  Total Score: 24              Prodigy Age Score      Prodigy Gender Score          ARISCAT Score for Postoperative Pulmonary Complications      Flowsheet Row Pre-Admission Testing from 7/16/2025 in [facility]   Age Calculated Score 16 filed at 07/16/2025 1243   Preoperative SpO2 0 filed at 07/16/2025 1243   Respiratory infection in the last month Either upper or lower (i.e., URI, bronchitis, pneumonia), with fever and antibiotic treatment 0 filed at 07/16/2025 1243   Preoperative anemia (Hgb less than 10 g/dl) 0 filed at 07/16/2025 1243   Surgical incision  0 filed at 07/16/2025 1243   Duration of surgery  0 filed at 07/16/2025 1243   Emergency Procedure  0 filed at 07/16/2025 1243   ARISCAT Total Score  16 filed at 07/16/2025  1243     Ronan Perioperative Risk for Myocardial Infarction or Cardiac Arrest (SEGUN)      Flowsheet Row Pre-Admission Testing from 7/16/2025 in [facility]   Calculated Age Score 1.68 filed at 07/16/2025 1243   Functional Status  0 filed at 07/16/2025 1243   ASA Class  -3.29 filed at 07/16/2025 1243   Creatinine 0 filed at 07/16/2025 1243   Type of Procedure  0.71  [cataract surgery] filed at 07/16/2025 1243   SEGUN Total Score  -6.15 filed at 07/16/2025 1243   SEGUN % 0.21 filed at 07/16/2025 1243       Assessment and Plan:     # HTN - continue cardura, hctz, hold lisinopril 24 hrs before surgery  # BPH - continue finasteride; followed by urology   # Combined form of age-related cataract - Right cataract extraction w/intraocular lens implantation w/Dr Weeks on 7/23/2025 and Left cataract extraction w/intraocular lens implantation w/Dr Weeks on 7/30/2025    Medical hx, Allergies, VS and Labs reviewed  Medications addressed w/pre-op instructions provided                 [1]   Past Medical History:  Diagnosis Date    BPH (benign prostatic hyperplasia)     Hypertension     Joint pain     Peripheral neuropathy     Vision loss    [2]   Past Surgical History:  Procedure Laterality Date    COLONOSCOPY      CYSTOSCOPY      UPPER GASTROINTESTINAL ENDOSCOPY     [3]   Family History  Family history unknown: Yes   [4] No Known Allergies

## 2025-07-23 ENCOUNTER — ANESTHESIA EVENT (OUTPATIENT)
Dept: OPERATING ROOM | Facility: HOSPITAL | Age: 85
End: 2025-07-23
Payer: MEDICARE

## 2025-07-23 ENCOUNTER — ANESTHESIA (OUTPATIENT)
Dept: OPERATING ROOM | Facility: HOSPITAL | Age: 85
End: 2025-07-23
Payer: MEDICARE

## 2025-07-23 ENCOUNTER — HOSPITAL ENCOUNTER (OUTPATIENT)
Facility: HOSPITAL | Age: 85
Setting detail: OUTPATIENT SURGERY
Discharge: HOME | End: 2025-07-23
Attending: OPHTHALMOLOGY | Admitting: OPHTHALMOLOGY
Payer: MEDICARE

## 2025-07-23 VITALS
HEART RATE: 59 BPM | RESPIRATION RATE: 14 BRPM | SYSTOLIC BLOOD PRESSURE: 167 MMHG | DIASTOLIC BLOOD PRESSURE: 75 MMHG | OXYGEN SATURATION: 99 % | TEMPERATURE: 98.4 F

## 2025-07-23 DIAGNOSIS — H25.811 COMBINED FORM OF AGE-RELATED CATARACT, RIGHT EYE: Primary | ICD-10-CM

## 2025-07-23 PROBLEM — F41.1 GAD (GENERALIZED ANXIETY DISORDER): Status: ACTIVE | Noted: 2019-02-01

## 2025-07-23 PROCEDURE — 3600000008 HC OR TIME - EACH INCREMENTAL 1 MINUTE - PROCEDURE LEVEL THREE: Performed by: OPHTHALMOLOGY

## 2025-07-23 PROCEDURE — 7100000009 HC PHASE TWO TIME - INITIAL BASE CHARGE: Performed by: OPHTHALMOLOGY

## 2025-07-23 PROCEDURE — V2632 POST CHMBR INTRAOCULAR LENS: HCPCS | Performed by: OPHTHALMOLOGY

## 2025-07-23 PROCEDURE — 2500000005 HC RX 250 GENERAL PHARMACY W/O HCPCS: Performed by: OPHTHALMOLOGY

## 2025-07-23 PROCEDURE — A66982 PR REMV CATARACT EXTRACAP,INSERT LENS,COMP

## 2025-07-23 PROCEDURE — 7100000010 HC PHASE TWO TIME - EACH INCREMENTAL 1 MINUTE: Performed by: OPHTHALMOLOGY

## 2025-07-23 PROCEDURE — 3700000001 HC GENERAL ANESTHESIA TIME - INITIAL BASE CHARGE: Performed by: OPHTHALMOLOGY

## 2025-07-23 PROCEDURE — 99100 ANES PT EXTEME AGE<1 YR&>70: CPT | Performed by: ANESTHESIOLOGY

## 2025-07-23 PROCEDURE — 3700000002 HC GENERAL ANESTHESIA TIME - EACH INCREMENTAL 1 MINUTE: Performed by: OPHTHALMOLOGY

## 2025-07-23 PROCEDURE — 2500000004 HC RX 250 GENERAL PHARMACY W/ HCPCS (ALT 636 FOR OP/ED): Performed by: OPHTHALMOLOGY

## 2025-07-23 PROCEDURE — 2760000001 HC OR 276 NO HCPCS: Performed by: OPHTHALMOLOGY

## 2025-07-23 PROCEDURE — 3600000003 HC OR TIME - INITIAL BASE CHARGE - PROCEDURE LEVEL THREE: Performed by: OPHTHALMOLOGY

## 2025-07-23 PROCEDURE — 66984 XCAPSL CTRC RMVL W/O ECP: CPT | Performed by: OPHTHALMOLOGY

## 2025-07-23 PROCEDURE — A66982 PR REMV CATARACT EXTRACAP,INSERT LENS,COMP: Performed by: ANESTHESIOLOGY

## 2025-07-23 PROCEDURE — 2500000004 HC RX 250 GENERAL PHARMACY W/ HCPCS (ALT 636 FOR OP/ED)

## 2025-07-23 DEVICE — IMPLANTABLE DEVICE: Type: IMPLANTABLE DEVICE | Site: EYE | Status: FUNCTIONAL

## 2025-07-23 RX ORDER — PROCHLORPERAZINE EDISYLATE 5 MG/ML
5 INJECTION INTRAMUSCULAR; INTRAVENOUS ONCE AS NEEDED
Status: DISCONTINUED | OUTPATIENT
Start: 2025-07-23 | End: 2025-07-23 | Stop reason: HOSPADM

## 2025-07-23 RX ORDER — PREDNISOLONE ACETATE 10 MG/ML
1 SUSPENSION/ DROPS OPHTHALMIC 4 TIMES DAILY
Qty: 5 ML | Refills: 1 | Status: SHIPPED | OUTPATIENT
Start: 2025-07-23

## 2025-07-23 RX ORDER — ACETAMINOPHEN 325 MG/1
650 TABLET ORAL EVERY 4 HOURS PRN
Status: DISCONTINUED | OUTPATIENT
Start: 2025-07-23 | End: 2025-07-23 | Stop reason: HOSPADM

## 2025-07-23 RX ORDER — ALBUTEROL SULFATE 0.83 MG/ML
2.5 SOLUTION RESPIRATORY (INHALATION) ONCE AS NEEDED
Status: DISCONTINUED | OUTPATIENT
Start: 2025-07-23 | End: 2025-07-23 | Stop reason: HOSPADM

## 2025-07-23 RX ORDER — ONDANSETRON HYDROCHLORIDE 2 MG/ML
4 INJECTION, SOLUTION INTRAVENOUS ONCE AS NEEDED
Status: DISCONTINUED | OUTPATIENT
Start: 2025-07-23 | End: 2025-07-23 | Stop reason: HOSPADM

## 2025-07-23 RX ORDER — TETRACAINE HYDROCHLORIDE 5 MG/ML
1 SOLUTION OPHTHALMIC ONCE
Status: COMPLETED | OUTPATIENT
Start: 2025-07-23 | End: 2025-07-23

## 2025-07-23 RX ORDER — MOXIFLOXACIN 5 MG/ML
1 SOLUTION/ DROPS OPHTHALMIC 4 TIMES DAILY
Qty: 3 ML | Refills: 0 | Status: SHIPPED | OUTPATIENT
Start: 2025-07-23

## 2025-07-23 RX ORDER — IPRATROPIUM BROMIDE 0.5 MG/2.5ML
500 SOLUTION RESPIRATORY (INHALATION) ONCE
Status: DISCONTINUED | OUTPATIENT
Start: 2025-07-23 | End: 2025-07-23 | Stop reason: HOSPADM

## 2025-07-23 RX ORDER — CYCLOPENTOLATE HYDROCHLORIDE 10 MG/ML
1 SOLUTION/ DROPS OPHTHALMIC
Status: COMPLETED | OUTPATIENT
Start: 2025-07-23 | End: 2025-07-23

## 2025-07-23 RX ORDER — PHENYLEPHRINE HYDROCHLORIDE 25 MG/ML
1 SOLUTION/ DROPS OPHTHALMIC
Status: COMPLETED | OUTPATIENT
Start: 2025-07-23 | End: 2025-07-23

## 2025-07-23 RX ORDER — TROPICAMIDE 10 MG/ML
1 SOLUTION/ DROPS OPHTHALMIC
Status: COMPLETED | OUTPATIENT
Start: 2025-07-23 | End: 2025-07-23

## 2025-07-23 RX ORDER — MIDAZOLAM HYDROCHLORIDE 1 MG/ML
INJECTION, SOLUTION INTRAMUSCULAR; INTRAVENOUS AS NEEDED
Status: DISCONTINUED | OUTPATIENT
Start: 2025-07-23 | End: 2025-07-23

## 2025-07-23 RX ORDER — KETOROLAC TROMETHAMINE 5 MG/ML
1 SOLUTION OPHTHALMIC 4 TIMES DAILY
Qty: 5 ML | Refills: 1 | Status: SHIPPED | OUTPATIENT
Start: 2025-07-23

## 2025-07-23 RX ORDER — LIDOCAINE HYDROCHLORIDE 10 MG/ML
0.1 INJECTION, SOLUTION INFILTRATION; PERINEURAL ONCE
Status: DISCONTINUED | OUTPATIENT
Start: 2025-07-23 | End: 2025-07-23 | Stop reason: HOSPADM

## 2025-07-23 RX ORDER — SODIUM CHLORIDE, SODIUM LACTATE, POTASSIUM CHLORIDE, CALCIUM CHLORIDE 600; 310; 30; 20 MG/100ML; MG/100ML; MG/100ML; MG/100ML
100 INJECTION, SOLUTION INTRAVENOUS CONTINUOUS
Status: DISCONTINUED | OUTPATIENT
Start: 2025-07-23 | End: 2025-07-23 | Stop reason: HOSPADM

## 2025-07-23 RX ADMIN — CYCLOPENTOLATE HYDROCHLORIDE 1 DROP: 10 SOLUTION OPHTHALMIC at 09:50

## 2025-07-23 RX ADMIN — CYCLOPENTOLATE HYDROCHLORIDE 1 DROP: 10 SOLUTION OPHTHALMIC at 09:40

## 2025-07-23 RX ADMIN — MIDAZOLAM 2 MG: 1 INJECTION INTRAMUSCULAR; INTRAVENOUS at 10:15

## 2025-07-23 RX ADMIN — TETRACAINE HYDROCHLORIDE 1 DROP: 5 SOLUTION OPHTHALMIC at 09:30

## 2025-07-23 RX ADMIN — CYCLOPENTOLATE HYDROCHLORIDE 1 DROP: 10 SOLUTION OPHTHALMIC at 09:30

## 2025-07-23 RX ADMIN — TROPICAMIDE 1 DROP: 10 SOLUTION/ DROPS OPHTHALMIC at 09:40

## 2025-07-23 RX ADMIN — PHENYLEPHRINE HYDROCHLORIDE 1 DROP: 25 SOLUTION/ DROPS OPHTHALMIC at 09:30

## 2025-07-23 RX ADMIN — TROPICAMIDE 1 DROP: 10 SOLUTION/ DROPS OPHTHALMIC at 09:30

## 2025-07-23 RX ADMIN — PHENYLEPHRINE HYDROCHLORIDE 1 DROP: 25 SOLUTION/ DROPS OPHTHALMIC at 09:40

## 2025-07-23 RX ADMIN — PHENYLEPHRINE HYDROCHLORIDE 1 DROP: 25 SOLUTION/ DROPS OPHTHALMIC at 09:50

## 2025-07-23 RX ADMIN — TROPICAMIDE 1 DROP: 10 SOLUTION/ DROPS OPHTHALMIC at 09:50

## 2025-07-23 SDOH — HEALTH STABILITY: MENTAL HEALTH: CURRENT SMOKER: 0

## 2025-07-23 ASSESSMENT — ENCOUNTER SYMPTOMS
RESPIRATORY NEGATIVE: 1
GASTROINTESTINAL NEGATIVE: 1
PSYCHIATRIC NEGATIVE: 1
CARDIOVASCULAR NEGATIVE: 1

## 2025-07-23 ASSESSMENT — COLUMBIA-SUICIDE SEVERITY RATING SCALE - C-SSRS
2. HAVE YOU ACTUALLY HAD ANY THOUGHTS OF KILLING YOURSELF?: NO
1. IN THE PAST MONTH, HAVE YOU WISHED YOU WERE DEAD OR WISHED YOU COULD GO TO SLEEP AND NOT WAKE UP?: NO
6. HAVE YOU EVER DONE ANYTHING, STARTED TO DO ANYTHING, OR PREPARED TO DO ANYTHING TO END YOUR LIFE?: NO

## 2025-07-23 ASSESSMENT — PAIN - FUNCTIONAL ASSESSMENT
PAIN_FUNCTIONAL_ASSESSMENT: 0-10
PAIN_FUNCTIONAL_ASSESSMENT: 0-10

## 2025-07-23 ASSESSMENT — PAIN SCALES - GENERAL
PAINLEVEL_OUTOF10: 0 - NO PAIN
PAINLEVEL_OUTOF10: 0 - NO PAIN

## 2025-07-23 NOTE — BRIEF OP NOTE
Date: 2025  OR Location: [facility] OR    Name: Renato Griffiths, : 1940, Age: 84 y.o., MRN: [id number], Sex: male    Diagnosis  Pre-op Diagnosis      * Combined form of age-related cataract, right eye [H25.811] Post-op Diagnosis     * Combined form of age-related cataract, right eye [H25.811]     Procedures  RIGHT CATARACT EXTRACTION WITH INTRAOCULAR LENS IMPLANTATION  69831 - [place] XCAPSL CTRC RMVL INSJ IO LENS PROSTH W/O ECP      Surgeons      * Pat Weeks - Primary    Resident/Fellow/Other Assistant:  Surgeons and Role:  * No surgeons found with a matching role *    Staff:   Circulator: Christina  Surgical Assistant: Derrick    Anesthesia Staff: Anesthesiologist: Aristeo Esparza MD  CRNA: VALERIANO Vera-CRNA    Procedure Summary  Anesthesia: Monitor Anesthesia Care  ASA: III  Estimated Blood Loss: 0 mL  Intra-op Medications:   Administrations occurring from 1015 to 1110 on 25:   Medication Name Total Dose   tobramycin-dexamethasone (Tobradex) ophthalmic ointment 0.5 inch   lidocaine PF (Xylocaine) 10 mg/mL (1 %) 1 mL, EPINEPHrine (Adrenalin) 1 mg/mL 0.5 mL in balanced salts (BSS) 3 mL syringe Cannot be calculated   midazolam (Versed) injection 1 mg/mL 2 mg              Anesthesia Record               Intraprocedure I/O Totals       None           Specimen: No specimens collected               Findings: right eye cataract    Complications:  None; patient tolerated the procedure well.     Disposition: PACU - hemodynamically stable.  Condition: stable  Specimens Collected: No specimens collected  Attending Attestation: I was present and scrubbed for the entire procedure.    Pat Weeks  Phone Number: [phone]

## 2025-07-23 NOTE — ANESTHESIA POSTPROCEDURE EVALUATION
Patient: Renato Griffiths    Procedure Summary       Date: 07/23/25 Room / Location: [facility] OR 02 / [facility] OR    Anesthesia Start: 1011 Anesthesia Stop:     Procedure: RIGHT CATARACT EXTRACTION WITH INTRAOCULAR LENS IMPLANTATION (Right) Diagnosis:       Combined form of age-related cataract, right eye      (Combined form of age-related cataract, right eye [H25.811])    Surgeons: Pat Weeks MD Responsible Provider: Aristeo Esparza MD    Anesthesia Type: MAC ASA Status: 3            Anesthesia Type: MAC    Vitals Value Taken Time   /75 07/23/25 10:50   Temp 36.9 °C (98.4 °F) 07/23/25 10:50   Pulse 59 07/23/25 10:50   Resp 14 07/23/25 10:50   SpO2 99 % 07/23/25 10:50       Anesthesia Post Evaluation    Patient location during evaluation: PACU  Patient participation: complete - patient participated  Level of consciousness: awake and alert  Pain management: adequate  Airway patency: patent  Cardiovascular status: acceptable  Respiratory status: acceptable  Hydration status: acceptable  Postoperative Nausea and Vomiting: none        There were no known notable events for this encounter.

## 2025-07-23 NOTE — H&P
History Of Present Illness  Renato Griffiths is a 84 y.o. male presenting with visually significant cataract of the right eye, here for cataract extraction and intraocular lens (IOL) implantation of the right eye.     Past Medical History  Past Medical History:   Diagnosis Date    BPH (benign prostatic hyperplasia)     Hypertension     Joint pain     Peripheral neuropathy     Vision loss        Surgical History  Surgical History[1]     Social History  He reports that he has quit smoking. His smoking use included cigarettes. He has never been exposed to tobacco smoke. He has never used smokeless tobacco. He reports current alcohol use of about 3.0 standard drinks of alcohol per week. He reports that he does not use drugs.    Family History  Family History[2]     Allergies  Patient has no known allergies.    Review of Systems   Respiratory: Negative.     Cardiovascular: Negative.    Gastrointestinal: Negative.    Psychiatric/Behavioral: Negative.       Physical exam:  Head: normocephalic  Eyes: see clinic note  Respiratory: per anesthesia  Cardiovascular: per anesthesia  Neuro: alert and interactive for age      Last Recorded Vitals  There were no vitals taken for this visit.    Relevant Results  Scheduled medications  Scheduled Medications[3]  Continuous medications  Continuous Medications[4]  PRN medications  PRN Medications[5]        Assessment & Plan  Combined form of age-related cataract, right eye    Mr. Griffiths is a 84 y.o. male presenting with visually significant cataract of the right eye, here for cataract extraction and intraocular lens (IOL) implantation of the right eye.    Plan to proceed with procedure as above.      Kalani Younger MD  Ophthalmology PGY-2         [1]   Past Surgical History:  Procedure Laterality Date    COLONOSCOPY      CYSTOSCOPY      UPPER GASTROINTESTINAL ENDOSCOPY     [2]   Family History  Family history unknown: Yes   [3] [4] [5]

## 2025-07-23 NOTE — OP NOTE
RIGHT CATARACT EXTRACTION WITH INTRAOCULAR LENS IMPLANTATION (R) Operative Note     Date: 2025  OR Location: [facility] OR    Name: Renato Griffiths, : 1940, Age: 84 y.o., MRN: [id number], Sex: male    Diagnosis  Pre-op Diagnosis      * Combined form of age-related cataract, right eye [H25.811] Post-op Diagnosis     * Combined form of age-related cataract, right eye [H25.811]     Procedures  RIGHT CATARACT EXTRACTION WITH INTRAOCULAR LENS IMPLANTATION  77821 - [place] XCAPSL CTRC RMVL INSJ IO LENS PROSTH W/O ECP      Surgeons      * Pat Weeks - Primary    Resident/Fellow/Other Assistant:  Surgeons and Role:  * No surgeons found with a matching role *    Staff:   Sabrinaulator: Christina  Surgical Assistant: Derrick    Anesthesia Staff: Anesthesiologist: Aristeo Esparza MD  CRNA: VALERIANO Vera-CRNA    Procedure Summary  Anesthesia: Monitor Anesthesia Care  ASA: III  Estimated Blood Loss: minimal mL  Intra-op Medications:   Administrations occurring from 1015 to 1110 on 25:   Medication Name Total Dose   tobramycin-dexamethasone (Tobradex) ophthalmic ointment 0.5 inch   lidocaine PF (Xylocaine) 10 mg/mL (1 %) 1 mL, EPINEPHrine (Adrenalin) 1 mg/mL 0.5 mL in balanced salts (BSS) 3 mL syringe Cannot be calculated   midazolam (Versed) injection 1 mg/mL 2 mg              Anesthesia Record               Intraprocedure I/O Totals       None           Specimen: No specimens collected              Drains and/or Catheters: * None in log *    Tourniquet Times:         Implants:  Implants       Type Name Action Serial No.       lens +19.0D Implanted [id number]              Findings: Right cataract    Indications: Renato Griffiths is an 84 y.o. male who is having surgery for Combined form of age-related cataract, right eye [H25.811].     The patient was seen in the preoperative area. The risks, benefits, complications, treatment options, non-operative alternatives, expected recovery and outcomes were discussed with the  patient. The possibilities of reaction to medication, pulmonary aspiration, injury to surrounding structures, bleeding, recurrent infection, the need for additional procedures, failure to diagnose a condition, and creating a complication requiring transfusion or operation were discussed with the patient. The patient concurred with the proposed plan, giving informed consent.  The site of surgery was properly noted/marked if necessary per policy. The patient has been actively warmed in preoperative area. Preoperative antibiotics are not indicated. Venous thrombosis prophylaxis are not indicated.    Procedure Details: The patient was placed in the supine position on the operating room table where appropriate blood pressure and cardiac monitoring were initiated. The patient was prepped and draped in the usual sterile fashion for intraocular surgery. This included instillation of Betadine 5% onto the ocular surface followed by irrigation with balance salt solution a minute or two later. A lid speculum was placed and the operating microscope was positioned. One paracentesis stab incision was made to the left of the planned cataract incision with a 15-degree supersharp blade. 1 ml of preservative free lidocaine was injected into the AC. Viscoat was used to replace the aqueous humor. A temporal clear corneal wound was fashioned beginning at the limbus with a 2.4 mm keratome, extending 2 mm into clear cornea before entering the anterior chamber. A continuous tear circular capsulorhexis of approximately 5 mm in diameter was performed. Hydrodissection was performed using a Georges canula. The endothelium was coated with viscoat again. Using the Ozil handpiece on the [employer] Lens Removal System, the nucleus was emulsified and aspirated using a divide-and-conquer technique. Residual cortex was removed from the eye with the irrigation/aspiration bimanually. ProVisc was used to inflate the capsular bag. The lens implant was  inspected and found to be free of visible defects. The lens used was an Kris model SY60WF, power 19.0 diopter intraocular lens. The lens was inserted into the capsular bag using the Spring insertion mechanism. The lens was centered with a Burks hook. Residual viscoelastic was removed from the eye with the irrigation/aspiration instrument. The wounds were checked and found to be watertight. One 10-0 nylon suture placed in the  ain wound The lid speculum was removed and the eye was dressed with Maxitrol ointment, eye pad, tape, and shield. The patient tolerated the procedure well, and there were no complications.    Evidence of Infection: No   Complications:  None; patient tolerated the procedure well.    Disposition: PACU - hemodynamically stable.  Condition: stable               Additional Details: Floppy iris.    Attending Attestation: I performed the procedure.    Pat Weeks  Phone Number: [phone]

## 2025-07-23 NOTE — DISCHARGE INSTRUCTIONS
Start the following eye drops in the operative eye:   Keep eye patch and shield on for 4 hours, then can remove eye patch but must leave clear plastic shield on, can take it off to start drops today    Postop instructions:  Prednisolone acetate drops:  4 times/day     Moxifloxacin drops:  4 times/day    Ketorolac drops:  4 times/day     Sleep with shield at night for 7 days  No eye rubbing  May shower and wash face but no water inside surgery eye  No lifting any weight above 10lbs  Patient to resume home medications.   Please call immediately if you develop any redness, pain, decreased vision, flashes, floaters.   Office phone (after hours): [phone]   Hospital : [phone] (call this number if unable to reach someone on the phone above) then ask for ophthalmologist on call.        Follow up tomorrow with Dr. Weeks (fermín will call you to confirm appointment time)

## 2025-07-23 NOTE — ANESTHESIA PREPROCEDURE EVALUATION
Patient: Renato Griffiths    Procedure Information       Date/Time: 07/23/25 1015    Procedure: RIGHT CATARACT EXTRACTION WITH INTRAOCULAR LENS IMPLANTATION (Right)    Location: [facility] OR 02 / [facility]    Surgeons: Pat Weeks MD            Relevant Problems   Cardiac   (+) Essential hypertension      Neuro   (+) PIPPA (generalized anxiety disorder)      Skin   (+) Atopic dermatitis of left eyelid       Clinical information reviewed:      Problems              NPO Detail:  No data recorded     Physical Exam    Airway  Mallampati: II  TM distance: >3 FB  Neck ROM: full     Cardiovascular - normal exam  Rhythm: regular  Rate: normal     Dental - normal exam     Pulmonary - normal exam   Abdominal      Other findings: Poor Dentition        Anesthesia Plan    History of general anesthesia?: yes  History of complications of general anesthesia?: no    ASA 3     MAC     The patient is not a current smoker.    intravenous induction   Anesthetic plan and risks discussed with patient.    Plan discussed with CRNA.

## 2025-07-24 ENCOUNTER — OFFICE VISIT (OUTPATIENT)
Dept: OPHTHALMOLOGY | Facility: CLINIC | Age: 85
End: 2025-07-24
Payer: MEDICARE

## 2025-07-24 DIAGNOSIS — Z96.1 PSEUDOPHAKIA: Primary | ICD-10-CM

## 2025-07-24 PROCEDURE — 99024 POSTOP FOLLOW-UP VISIT: CPT | Performed by: OPHTHALMOLOGY

## 2025-07-24 ASSESSMENT — EXTERNAL EXAM - LEFT EYE: OS_EXAM: NORMAL

## 2025-07-24 ASSESSMENT — EXTERNAL EXAM - RIGHT EYE: OD_EXAM: NORMAL

## 2025-07-24 ASSESSMENT — TONOMETRY
OD_IOP_MMHG: 18
IOP_METHOD: GOLDMANN APPLANATION

## 2025-07-24 ASSESSMENT — VISUAL ACUITY
OD_SC: 20/30
METHOD: SNELLEN - LINEAR

## 2025-07-24 NOTE — PROGRESS NOTES
Assessment/Plan   Diagnoses and all orders for this visit:  Pseudophakia    POD#1, doing well, floppy iris syndrome      Plan:  Pred QID  Moxi QID  Ketorolac QID  Return precautions  See in 1 week for CEIOL OS as scheduled.

## 2025-07-25 NOTE — PREPROCEDURE INSTRUCTIONS
Current Medications    Medication Instructions    aspirin 325 mg EC tablet Continue as prescribed    cyanocobalamin, vitamin B-12, 2,500 mcg tablet, sublingual SL tablet Continue as prescribed    doxazosin (Cardura) 4 mg tablet Continue as prescribed    finasteride (Proscar) 5 mg tablet Continue as prescribed    hydroCHLOROthiazide (Microzide) 12.5 mg capsule Hold day of surgery    ketorolac (Acular) 0.5 % ophthalmic solution Continue as prescribed    lisinopril 20 mg tablet Hold Day of surgery    Miebo, PF, 100 % drops Continue as prescribed    moxifloxacin (Vigamox) 0.5 % ophthalmic solution Continue as prescribed    neomycin-polymyxin B-dexameth (Polydex) 3.5 mg/g-10,000 unit/g-0.1 % ointment ophthalmic ointment Continue as prescribed    prednisoLONE acetate (Pred-Forte) 1 % ophthalmic suspension Continue as prescribed    sennosides (Senokot) 8.6 mg tablet Continue as prescribed         NPO Instructions:  Do not eat any food after midnight the night before your surgery/procedure.  You may have 13 ounces of clear liquids until TWO hours before arrival time. This includes water, black tea/coffee, (no milk or cream) apple juice and electrolyte drinks (Gatorade). No red colors.    Additional Instructions:     The day before surgery:  You will be contacted regarding the time of your arrival to facility and surgery time    The day of surgery:  Enter through the main entrance of [facility], located at [address] Proceed to registration, located on the right hand side of the staircase. You will need your ID and insurance card for registration. Please ensure you have a responsible adult to drive you home. A responsible adult DOES NOT include rideshare service drivers (Uber, Lyft, etc), cab drivers, or public transportation drivers, but “provide a ride” is acceptable.    Take a shower before your procedure. After your shower avoid lotions, powders, deodorants or anything applied to the skin. If you wear  contacts or glasses, wear the glasses. If you do not have glasses, please bring a case for your contacts. You may wear hearing aids and dentures, bring a case for them or we will provide one. Make sure you wear something loose and comfortable. Keep in mind your surgical procedure and wear something that will accommodate incisions or bandages. Please remove all jewelry and piercing's.     For further questions Rodriguez GRANGER can be contacted at [phone] between 7AM-3PM.

## 2025-07-30 ENCOUNTER — ANESTHESIA EVENT (OUTPATIENT)
Dept: OPERATING ROOM | Facility: HOSPITAL | Age: 85
End: 2025-07-30
Payer: MEDICARE

## 2025-07-30 ENCOUNTER — HOSPITAL ENCOUNTER (OUTPATIENT)
Facility: HOSPITAL | Age: 85
Setting detail: OUTPATIENT SURGERY
Discharge: HOME | End: 2025-07-30
Attending: OPHTHALMOLOGY | Admitting: OPHTHALMOLOGY
Payer: MEDICARE

## 2025-07-30 ENCOUNTER — ANESTHESIA (OUTPATIENT)
Dept: OPERATING ROOM | Facility: HOSPITAL | Age: 85
End: 2025-07-30
Payer: MEDICARE

## 2025-07-30 VITALS
DIASTOLIC BLOOD PRESSURE: 75 MMHG | SYSTOLIC BLOOD PRESSURE: 165 MMHG | WEIGHT: 180.78 LBS | OXYGEN SATURATION: 100 % | TEMPERATURE: 97.5 F | RESPIRATION RATE: 17 BRPM | BODY MASS INDEX: 26.7 KG/M2 | HEART RATE: 53 BPM

## 2025-07-30 DIAGNOSIS — H25.812 COMBINED FORM OF AGE-RELATED CATARACT, LEFT EYE: Primary | ICD-10-CM

## 2025-07-30 PROBLEM — D64.9 NORMOCHROMIC NORMOCYTIC ANEMIA: Status: ACTIVE | Noted: 2021-03-02

## 2025-07-30 PROCEDURE — 7100000009 HC PHASE TWO TIME - INITIAL BASE CHARGE: Performed by: OPHTHALMOLOGY

## 2025-07-30 PROCEDURE — 2500000004 HC RX 250 GENERAL PHARMACY W/ HCPCS (ALT 636 FOR OP/ED): Performed by: OPHTHALMOLOGY

## 2025-07-30 PROCEDURE — 99100 ANES PT EXTEME AGE<1 YR&>70: CPT | Performed by: ANESTHESIOLOGY

## 2025-07-30 PROCEDURE — 2500000004 HC RX 250 GENERAL PHARMACY W/ HCPCS (ALT 636 FOR OP/ED): Performed by: ANESTHESIOLOGIST ASSISTANT

## 2025-07-30 PROCEDURE — 2500000005 HC RX 250 GENERAL PHARMACY W/O HCPCS: Performed by: OPHTHALMOLOGY

## 2025-07-30 PROCEDURE — V2632 POST CHMBR INTRAOCULAR LENS: HCPCS | Performed by: OPHTHALMOLOGY

## 2025-07-30 PROCEDURE — 66984 XCAPSL CTRC RMVL W/O ECP: CPT | Performed by: OPHTHALMOLOGY

## 2025-07-30 PROCEDURE — 3700000002 HC GENERAL ANESTHESIA TIME - EACH INCREMENTAL 1 MINUTE: Performed by: OPHTHALMOLOGY

## 2025-07-30 PROCEDURE — 7100000010 HC PHASE TWO TIME - EACH INCREMENTAL 1 MINUTE: Performed by: OPHTHALMOLOGY

## 2025-07-30 PROCEDURE — 3700000001 HC GENERAL ANESTHESIA TIME - INITIAL BASE CHARGE: Performed by: OPHTHALMOLOGY

## 2025-07-30 PROCEDURE — 3600000008 HC OR TIME - EACH INCREMENTAL 1 MINUTE - PROCEDURE LEVEL THREE: Performed by: OPHTHALMOLOGY

## 2025-07-30 PROCEDURE — 3600000003 HC OR TIME - INITIAL BASE CHARGE - PROCEDURE LEVEL THREE: Performed by: OPHTHALMOLOGY

## 2025-07-30 PROCEDURE — A66982 PR REMV CATARACT EXTRACAP,INSERT LENS,COMP: Performed by: ANESTHESIOLOGIST ASSISTANT

## 2025-07-30 PROCEDURE — 2760000001 HC OR 276 NO HCPCS: Performed by: OPHTHALMOLOGY

## 2025-07-30 PROCEDURE — A66982 PR REMV CATARACT EXTRACAP,INSERT LENS,COMP: Performed by: ANESTHESIOLOGY

## 2025-07-30 DEVICE — IMPLANTABLE DEVICE: Type: IMPLANTABLE DEVICE | Site: EYE | Status: FUNCTIONAL

## 2025-07-30 RX ORDER — TETRACAINE HYDROCHLORIDE 5 MG/ML
SOLUTION OPHTHALMIC AS NEEDED
Status: DISCONTINUED | OUTPATIENT
Start: 2025-07-30 | End: 2025-07-30 | Stop reason: HOSPADM

## 2025-07-30 RX ORDER — IPRATROPIUM BROMIDE 0.5 MG/2.5ML
500 SOLUTION RESPIRATORY (INHALATION) ONCE
Status: DISCONTINUED | OUTPATIENT
Start: 2025-07-30 | End: 2025-07-30 | Stop reason: HOSPADM

## 2025-07-30 RX ORDER — TETRACAINE HYDROCHLORIDE 5 MG/ML
1 SOLUTION OPHTHALMIC ONCE
Status: COMPLETED | OUTPATIENT
Start: 2025-07-30 | End: 2025-07-30

## 2025-07-30 RX ORDER — PROCHLORPERAZINE EDISYLATE 5 MG/ML
5 INJECTION INTRAMUSCULAR; INTRAVENOUS ONCE AS NEEDED
Status: DISCONTINUED | OUTPATIENT
Start: 2025-07-30 | End: 2025-07-30 | Stop reason: HOSPADM

## 2025-07-30 RX ORDER — POVIDONE-IODINE 5 %
SOLUTION, NON-ORAL OPHTHALMIC (EYE) AS NEEDED
Status: DISCONTINUED | OUTPATIENT
Start: 2025-07-30 | End: 2025-07-30 | Stop reason: HOSPADM

## 2025-07-30 RX ORDER — ACETAMINOPHEN 325 MG/1
650 TABLET ORAL EVERY 4 HOURS PRN
Status: DISCONTINUED | OUTPATIENT
Start: 2025-07-30 | End: 2025-07-30 | Stop reason: HOSPADM

## 2025-07-30 RX ORDER — MOXIFLOXACIN 5 MG/ML
1 SOLUTION/ DROPS OPHTHALMIC 4 TIMES DAILY
Qty: 3 ML | Refills: 0 | Status: SHIPPED | OUTPATIENT
Start: 2025-07-30

## 2025-07-30 RX ORDER — ONDANSETRON HYDROCHLORIDE 2 MG/ML
4 INJECTION, SOLUTION INTRAVENOUS ONCE AS NEEDED
Status: DISCONTINUED | OUTPATIENT
Start: 2025-07-30 | End: 2025-07-30 | Stop reason: HOSPADM

## 2025-07-30 RX ORDER — CYCLOPENTOLATE HYDROCHLORIDE 10 MG/ML
1 SOLUTION/ DROPS OPHTHALMIC
Status: COMPLETED | OUTPATIENT
Start: 2025-07-30 | End: 2025-07-30

## 2025-07-30 RX ORDER — PHENYLEPHRINE HYDROCHLORIDE 25 MG/ML
1 SOLUTION/ DROPS OPHTHALMIC
Status: COMPLETED | OUTPATIENT
Start: 2025-07-30 | End: 2025-07-30

## 2025-07-30 RX ORDER — TROPICAMIDE 10 MG/ML
1 SOLUTION/ DROPS OPHTHALMIC
Status: COMPLETED | OUTPATIENT
Start: 2025-07-30 | End: 2025-07-30

## 2025-07-30 RX ORDER — LIDOCAINE HYDROCHLORIDE 10 MG/ML
0.1 INJECTION, SOLUTION INFILTRATION; PERINEURAL ONCE
Status: DISCONTINUED | OUTPATIENT
Start: 2025-07-30 | End: 2025-07-30 | Stop reason: HOSPADM

## 2025-07-30 RX ORDER — ALBUTEROL SULFATE 0.83 MG/ML
2.5 SOLUTION RESPIRATORY (INHALATION) ONCE AS NEEDED
Status: DISCONTINUED | OUTPATIENT
Start: 2025-07-30 | End: 2025-07-30 | Stop reason: HOSPADM

## 2025-07-30 RX ORDER — MIDAZOLAM HYDROCHLORIDE 1 MG/ML
INJECTION, SOLUTION INTRAMUSCULAR; INTRAVENOUS AS NEEDED
Status: DISCONTINUED | OUTPATIENT
Start: 2025-07-30 | End: 2025-07-30

## 2025-07-30 RX ORDER — SODIUM CHLORIDE, SODIUM LACTATE, POTASSIUM CHLORIDE, CALCIUM CHLORIDE 600; 310; 30; 20 MG/100ML; MG/100ML; MG/100ML; MG/100ML
100 INJECTION, SOLUTION INTRAVENOUS CONTINUOUS
Status: DISCONTINUED | OUTPATIENT
Start: 2025-07-30 | End: 2025-07-30 | Stop reason: HOSPADM

## 2025-07-30 RX ORDER — PREDNISOLONE ACETATE 10 MG/ML
1 SUSPENSION/ DROPS OPHTHALMIC 4 TIMES DAILY
Qty: 5 ML | Refills: 1 | Status: SHIPPED | OUTPATIENT
Start: 2025-07-30

## 2025-07-30 RX ADMIN — TROPICAMIDE 1 DROP: 10 SOLUTION/ DROPS OPHTHALMIC at 07:05

## 2025-07-30 RX ADMIN — CYCLOPENTOLATE HYDROCHLORIDE 1 DROP: 10 SOLUTION OPHTHALMIC at 06:47

## 2025-07-30 RX ADMIN — TROPICAMIDE 1 DROP: 10 SOLUTION/ DROPS OPHTHALMIC at 06:47

## 2025-07-30 RX ADMIN — CYCLOPENTOLATE HYDROCHLORIDE 1 DROP: 10 SOLUTION OPHTHALMIC at 06:36

## 2025-07-30 RX ADMIN — CYCLOPENTOLATE HYDROCHLORIDE 1 DROP: 10 SOLUTION OPHTHALMIC at 07:05

## 2025-07-30 RX ADMIN — MIDAZOLAM 1 MG: 1 INJECTION INTRAMUSCULAR; INTRAVENOUS at 07:30

## 2025-07-30 RX ADMIN — PHENYLEPHRINE HYDROCHLORIDE 1 DROP: 25 SOLUTION/ DROPS OPHTHALMIC at 07:05

## 2025-07-30 RX ADMIN — TROPICAMIDE 1 DROP: 10 SOLUTION/ DROPS OPHTHALMIC at 06:35

## 2025-07-30 RX ADMIN — PHENYLEPHRINE HYDROCHLORIDE 1 DROP: 25 SOLUTION/ DROPS OPHTHALMIC at 06:47

## 2025-07-30 RX ADMIN — TETRACAINE HYDROCHLORIDE 1 DROP: 5 SOLUTION OPHTHALMIC at 06:35

## 2025-07-30 RX ADMIN — PHENYLEPHRINE HYDROCHLORIDE 1 DROP: 25 SOLUTION/ DROPS OPHTHALMIC at 06:35

## 2025-07-30 SDOH — HEALTH STABILITY: MENTAL HEALTH: CURRENT SMOKER: 0

## 2025-07-30 ASSESSMENT — COLUMBIA-SUICIDE SEVERITY RATING SCALE - C-SSRS
6. HAVE YOU EVER DONE ANYTHING, STARTED TO DO ANYTHING, OR PREPARED TO DO ANYTHING TO END YOUR LIFE?: NO
1. IN THE PAST MONTH, HAVE YOU WISHED YOU WERE DEAD OR WISHED YOU COULD GO TO SLEEP AND NOT WAKE UP?: NO
2. HAVE YOU ACTUALLY HAD ANY THOUGHTS OF KILLING YOURSELF?: NO

## 2025-07-30 ASSESSMENT — PAIN - FUNCTIONAL ASSESSMENT
PAIN_FUNCTIONAL_ASSESSMENT: 0-10
PAIN_FUNCTIONAL_ASSESSMENT: 0-10

## 2025-07-30 ASSESSMENT — ENCOUNTER SYMPTOMS
CARDIOVASCULAR NEGATIVE: 1
RESPIRATORY NEGATIVE: 1
GASTROINTESTINAL NEGATIVE: 1
PSYCHIATRIC NEGATIVE: 1

## 2025-07-30 ASSESSMENT — PAIN SCALES - GENERAL
PAINLEVEL_OUTOF10: 0 - NO PAIN
PAINLEVEL_OUTOF10: 0 - NO PAIN

## 2025-07-30 NOTE — H&P
History Of Present Illness  Renato Griffiths is a 84 y.o. male presenting with visually significant cataract of the left eye, here for cataract extraction and IOL implantation of the left eye.     Past Medical History  Past Medical History:   Diagnosis Date    BPH (benign prostatic hyperplasia)     Hypertension     Joint pain     Peripheral neuropathy     Vision loss        Surgical History  Surgical History[1]     Social History  He reports that he has quit smoking. His smoking use included cigarettes. He has never been exposed to tobacco smoke. He has never used smokeless tobacco. He reports current alcohol use of about 3.0 standard drinks of alcohol per week. He reports that he does not use drugs.    Family History  Family History[2]     Allergies  Patient has no known allergies.    Review of Systems   Respiratory: Negative.     Cardiovascular: Negative.    Gastrointestinal: Negative.    Psychiatric/Behavioral: Negative.     All other systems negative     Physical exam:  Head: normocephalic  Eyes: see clinic note  Respiratory: per anesthesia  Cardiovascular: per anesthesia  Neuro: alert and interactive for age      Last Recorded Vitals  Blood pressure (!) 201/83, pulse 56, temperature 36.2 °C (97.2 °F), temperature source Temporal, resp. rate 18, weight 82 kg (180 lb 12.4 oz), SpO2 99%.    Relevant Results  Scheduled medications  Scheduled Medications[3]  Continuous medications  Continuous Medications[4]  PRN medications  PRN Medications[5]      Assessment & Plan  Combined form of age-related cataract, left eye      Mr. Griffiths is a 84 y.o. male presenting with visually significant cataract of the left eye, here for cataract extraction and IOL implantation of the left eye.    Plan to proceed with procedure as above.      Kalani Younger MD  Ophthalmology PGY-2       [1]   Past Surgical History:  Procedure Laterality Date    CATARACT EXTRACTION Right 07/23/2025    Dr. Weeks    COLONOSCOPY      CYSTOSCOPY      UPPER  GASTROINTESTINAL ENDOSCOPY     [2]   Family History  Family history unknown: Yes   [3] cyclopentolate, 1 drop, Left Eye, q10 min  phenylephrine, 1 drop, Left Eye, q10 min  tropicamide, 1 drop, Left Eye, q10 min  [4]    [5]

## 2025-07-30 NOTE — BRIEF OP NOTE
Date: 2025  OR Location: [facility] OR    Name: Renato Griffiths, : 1940, Age: 84 y.o., MRN: [id number], Sex: male    Diagnosis  Pre-op Diagnosis      * Combined form of age-related cataract, left eye [H25.812] Post-op Diagnosis     * Combined form of age-related cataract, left eye [H25.812]     Procedures  LEFT CATARACT EXTRACTION WITH INTRAOCULAR LENS IMPLANTATION  85457 - IN XCAPSL CTRC RMVL INSJ IO LENS PROSTH W/O ECP      Surgeons      * Pat Weeks - Primary    Resident/Fellow/Other Assistant:  Surgeons and Role:  * No surgeons found with a matching role *    Staff:   Circulator: Rajni    Anesthesia Staff: Anesthesiologist: Aristeo Esparza MD  C-AA: TIMO Alcocer    Procedure Summary  Anesthesia: Monitor Anesthesia Care  ASA: III  Estimated Blood Loss: 0 mL  Intra-op Medications:   Administrations occurring from 0730 to 0820 on 25:   Medication Name Total Dose   povidone-iodine 5 % ophthalmic solution 1 Application   tetracaine (PF) 0.5 % ophthalmic solution 1 drop   lidocaine PF (Xylocaine) 10 mg/mL (1 %) 1 mL, EPINEPHrine (Adrenalin) 1 mg/mL 0.5 mL in balanced salts (BSS) 0.5 mL syringe Cannot be calculated   tobramycin-dexamethasone (Tobradex) ophthalmic ointment 0.5 inch   midazolam (Versed) injection 1 mg/mL 1 mg              Anesthesia Record               Intraprocedure I/O Totals       None           Specimen: No specimens collected       Findings: left eye cataract    Complications:  None; patient tolerated the procedure well.     Disposition: PACU - hemodynamically stable.  Condition: stable  Specimens Collected: No specimens collected  Attending Attestation: I was present and scrubbed for the entire procedure.    Pat Weeks  Phone Number: [phone]

## 2025-07-30 NOTE — DISCHARGE INSTRUCTIONS
Start the following eye drops in the operative eye:   Keep eye patch and shield on for 4 hours, then can remove eye patch but must leave clear plastic shield on, can take it off to start drops today    Postop instructions:  Prednisolone acetate drops:  4 times/day     Moxifloxacin drops:  4 times/day     Sleep with shield at night for 7 days  No eye rubbing  May shower and wash face but no water inside surgery eye  No lifting any weight above 10lbs  Patient to resume home medications.   Please call immediately if you develop any redness, pain, decreased vision, flashes, floaters.   Office phone (after hours): [phone]   [facility] : [phone] (call this number if unable to reach someone on the phone above) then ask for ophthalmologist on call.        Follow up tomorrow with Dr. Weeks (Verónica will call you to confirm appointment time)

## 2025-07-30 NOTE — ANESTHESIA PREPROCEDURE EVALUATION
Patient: Renato Griffiths    Procedure Information       Date/Time: 07/30/25 0730    Procedure: LEFT CATARACT EXTRACTION WITH INTRAOCULAR LENS IMPLANTATION (Left)    Location: [facility] 04 / [facility]    Surgeons: Pat Weeks MD            Relevant Problems   Cardiac   (+) Essential hypertension      Neuro   (+) PIPPA (generalized anxiety disorder)      Hematology   (+) Normochromic normocytic anemia      Skin   (+) Atopic dermatitis of left eyelid       Clinical information reviewed:    Allergies  Meds  Problems              NPO Detail:  No data recorded     Physical Exam    Airway  Mallampati: III  TM distance: <3 FB  Neck ROM: full  Mouth opening: 3 or more finger widths     Cardiovascular - normal exam  Rhythm: regular  Rate: normal     Dental - normal exam     Pulmonary - normal exam   Abdominal            Anesthesia Plan    History of general anesthesia?: yes  History of complications of general anesthesia?: no    ASA 3     MAC     The patient is not a current smoker.    intravenous induction   Anesthetic plan and risks discussed with patient.    Plan discussed with CAA.

## 2025-07-30 NOTE — ANESTHESIA POSTPROCEDURE EVALUATION
Patient: Renato Griffiths    Procedure Summary       Date: 07/30/25 Room / Location: [facility] / [facility]    Anesthesia Start: 0728 Anesthesia Stop: 0807    Procedure: LEFT CATARACT EXTRACTION WITH INTRAOCULAR LENS IMPLANTATION (Left) Diagnosis:       Combined form of age-related cataract, left eye      (Combined form of age-related cataract, left eye [H25.812])    Surgeons: Pat Weeks MD Responsible Provider: Aristeo Esparza MD    Anesthesia Type: MAC ASA Status: 3            Anesthesia Type: MAC    Vitals Value Taken Time   /75 07/30/25 08:07   Temp 36.0 07/30/25 08:07   Pulse 56 07/30/25 08:07   Resp 16 07/30/25 08:07   SpO2 100 07/30/25 08:07       Anesthesia Post Evaluation    Patient location during evaluation: PACU  Patient participation: complete - patient participated  Level of consciousness: awake  Pain management: adequate  Airway patency: patent  Cardiovascular status: acceptable  Respiratory status: acceptable  Hydration status: acceptable  Postoperative Nausea and Vomiting: none        No notable events documented.

## 2025-07-30 NOTE — OP NOTE
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]     Procedures  LEFT CATARACT EXTRACTION WITH INTRAOCULAR LENS IMPLANTATION  15864 - OK XCAPSL CTRC RMVL INSJ IO LENS PROSTH W/O ECP      Surgeons      * Pat Weeks - Primary    Resident/Fellow/Other Assistant:  Surgeons and Role:  * No surgeons found with a matching role *    Staff:   Maikel: Rajni    Anesthesia Staff: Anesthesiologist: Aristeo Esparza MD  C-AA: TIMO Alcocer    Procedure Summary  Anesthesia: Monitor Anesthesia Care  ASA: III  Estimated Blood Loss: minimal mL  Intra-op Medications:   Administrations occurring from 0730 to 0820 on 25:   Medication Name Total Dose   povidone-iodine 5 % ophthalmic solution 1 Application   tetracaine (PF) 0.5 % ophthalmic solution 1 drop   lidocaine PF (Xylocaine) 10 mg/mL (1 %) 1 mL, EPINEPHrine (Adrenalin) 1 mg/mL 0.5 mL in balanced salts (BSS) 0.5 mL syringe Cannot be calculated   tobramycin-dexamethasone (Tobradex) ophthalmic ointment 0.5 inch   midazolam (Versed) injection 1 mg/mL 1 mg              Anesthesia Record               Intraprocedure I/O Totals       None           Specimen: No specimens collected              Drains and/or Catheters: * None in log *    Tourniquet Times:         Implants:  Implants       Type Name Action Serial No.       IOL Implanted [id number]              Findings: Left cataract    Indications: Renato Griffiths is an 84 y.o. 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     The patient was seen in the preoperative area. The risks, benefits, complications, treatment options, non-operative  alternatives, expected recovery and outcomes were discussed with the patient. The possibilities of reaction to medication, pulmonary aspiration, injury to surrounding structures, bleeding, recurrent infection, the need for additional procedures, failure to diagnose a condition, and creating a complication requiring transfusion or operation were discussed with the patient. The patient concurred with the proposed plan, giving informed consent.  The site of surgery was properly noted/marked if necessary per policy. The patient has been actively warmed in preoperative area. Preoperative antibiotics are not indicated. Venous thrombosis prophylaxis are not indicated.    Procedure Details: The patient was placed in the supine position on the operating room table where appropriate blood pressure and cardiac monitoring were initiated. The patient was prepped and draped in the usual sterile fashion for intraocular surgery. This included instillation of Betadine 5% onto the ocular surface followed by irrigation with balance salt solution a minute or two later. A lid speculum was placed and the operating microscope was positioned. One paracentesis stab incision was made to the left of the planned cataract incision with a 15-degree supersharp blade. 1 ml of preservative free lidocaine was injected into the AC. Viscoat was used to replace the aqueous humor. A temporal clear corneal wound was fashioned beginning at the limbus with a 2.4 mm keratome, extending 2 mm into clear cornea before entering the anterior chamber. A continuous tear circular capsulorhexis of approximately 5 mm in diameter was performed. Hydrodissection was performed using a Georges canula. The endothelium was coated with viscoat again. Using the Ozil handpiece on the Vyatta Lens Removal System, the nucleus was emulsified and aspirated using a divide-and-conquer technique. Residual cortex was removed from the eye with the irrigation/aspiration bimanually.  ProVisc was used to inflate the capsular bag. The lens implant was inspected and found to be free of visible defects. The lens used was an Kris model SY60WF, power 21.50 diopter intraocular lens. The lens was inserted into the capsular bag using he Edison insertion mechanism. The lens was centered with a Burks hook. Residual viscoelastic was removed from the eye with the irrigation/aspiration instrument. The wounds were checked and found to be watertight. The lid speculum was removed and the eye was dressed with Maxitrol ointment, eye pad, tape, and shield. The patient tolerated the procedure well, and there were no complications.    Evidence of Infection: No   Complications:  None; patient tolerated the procedure well.    Disposition: PACU - hemodynamically stable.  Condition: stable                 Additional Details: floppy iris.    Attending Attestation: I performed the procedure.    Pat Weeks  Phone Number: [phone]

## 2025-07-31 ENCOUNTER — APPOINTMENT (OUTPATIENT)
Dept: OPHTHALMOLOGY | Facility: CLINIC | Age: 85
End: 2025-07-31
Payer: MEDICARE

## 2025-07-31 DIAGNOSIS — Z96.1 PSEUDOPHAKIA: ICD-10-CM

## 2025-07-31 DIAGNOSIS — H25.13 NUCLEAR SCLEROSIS OF BOTH EYES: Primary | ICD-10-CM

## 2025-07-31 PROCEDURE — 99024 POSTOP FOLLOW-UP VISIT: CPT | Performed by: OPHTHALMOLOGY

## 2025-07-31 ASSESSMENT — EXTERNAL EXAM - RIGHT EYE: OD_EXAM: NORMAL

## 2025-07-31 ASSESSMENT — ENCOUNTER SYMPTOMS
PSYCHIATRIC NEGATIVE: 0
GASTROINTESTINAL NEGATIVE: 0
ALLERGIC/IMMUNOLOGIC NEGATIVE: 0
MUSCULOSKELETAL NEGATIVE: 0
HEMATOLOGIC/LYMPHATIC NEGATIVE: 0
CARDIOVASCULAR NEGATIVE: 0
ENDOCRINE NEGATIVE: 0
EYES NEGATIVE: 0
CONSTITUTIONAL NEGATIVE: 0
NEUROLOGICAL NEGATIVE: 0
RESPIRATORY NEGATIVE: 0

## 2025-07-31 ASSESSMENT — TONOMETRY
IOP_METHOD: TONOPEN
OD_IOP_MMHG: 17
OS_IOP_MMHG: 19

## 2025-07-31 ASSESSMENT — VISUAL ACUITY
METHOD: SNELLEN - LINEAR
OD_SC: 20/25
OS_SC: 20/30

## 2025-07-31 ASSESSMENT — EXTERNAL EXAM - LEFT EYE: OS_EXAM: NORMAL

## 2025-07-31 NOTE — PROGRESS NOTES
Assessment/Plan   Diagnoses and all orders for this visit:  Pseudophakia    POW#1 post CEIOL OD, doing well, Suture removed under betadine cover.    POD#1 post CEIOL OS, doing well.      Plan:  Moxi - 4 times a day in BOTH eyes, until the bottle runs out    Pred   RIGHT eye - 3 times a day, then 2 times a day for one week, then 1 time a day for one week, then STOP  LEFT eye- 4 times a day for one week, then 3 times a day for one week, 2 times a day for one week, 1 time a day for one week, then STOP    Gray top - 2 times a day in RIGHT eye, until the bottle runs out    See in 3-4 weeks for refraction.

## 2025-07-31 NOTE — PATIENT INSTRUCTIONS
Tan drop - 4 times a day in BOTH eyes, until the bottle runs out    Pink top   RIGHT eye - 3 times a day, then 2 times a day for one week, then 1 time a day for one week, then STOP  LEFT eye- 4 times a day for one week, then 3 times a day for one week, 2 times a day for one week, 1 time a day for one week, then STOP    Gray top - 2 times a day in RIGHT eye, until the bottle runs out

## 2025-08-07 ENCOUNTER — APPOINTMENT (OUTPATIENT)
Dept: OPHTHALMOLOGY | Facility: CLINIC | Age: 85
End: 2025-08-07
Payer: MEDICARE

## 2025-08-21 ENCOUNTER — APPOINTMENT (OUTPATIENT)
Dept: OPHTHALMOLOGY | Age: 85
End: 2025-08-21
Payer: MEDICARE

## 2025-08-21 DIAGNOSIS — Z96.1 PSEUDOPHAKIA: Primary | ICD-10-CM

## 2025-08-21 PROCEDURE — 99024 POSTOP FOLLOW-UP VISIT: CPT | Performed by: OPHTHALMOLOGY

## 2025-08-21 ASSESSMENT — ENCOUNTER SYMPTOMS
CONSTITUTIONAL NEGATIVE: 0
RESPIRATORY NEGATIVE: 0
GASTROINTESTINAL NEGATIVE: 0
CARDIOVASCULAR NEGATIVE: 0
NEUROLOGICAL NEGATIVE: 0
ENDOCRINE NEGATIVE: 0
ALLERGIC/IMMUNOLOGIC NEGATIVE: 0
EYES NEGATIVE: 1
MUSCULOSKELETAL NEGATIVE: 0
HEMATOLOGIC/LYMPHATIC NEGATIVE: 0
PSYCHIATRIC NEGATIVE: 0

## 2025-08-21 ASSESSMENT — VISUAL ACUITY
OS_SC+: -2
OD_SC: 20/20
METHOD: SNELLEN - LINEAR
OS_SC: 20/25

## 2025-08-21 ASSESSMENT — REFRACTION_MANIFEST
OS_CYLINDER: +0.25
OD_ADD: +2.00
OS_ADD: +2.00
METHOD_AUTOREFRACTION: 1
METHOD_AUTOREFRACTION: 1
OD_CYLINDER: -0.75
OD_CYLINDER: -0.75
OS_AXIS: 045
OD_AXIS: 066
OD_SPHERE: +0.75
OS_SPHERE: UNABLE
OD_SPHERE: +1.25
OS_SPHERE: PLANO
OD_AXIS: 066

## 2025-08-21 ASSESSMENT — TONOMETRY
IOP_METHOD: GOLDMANN APPLANATION
OD_IOP_MMHG: 17
OS_IOP_MMHG: 18

## 2025-08-21 ASSESSMENT — EXTERNAL EXAM - RIGHT EYE: OD_EXAM: NORMAL

## 2025-08-21 ASSESSMENT — EXTERNAL EXAM - LEFT EYE: OS_EXAM: NORMAL

## 2025-09-04 ENCOUNTER — APPOINTMENT (OUTPATIENT)
Dept: OPHTHALMOLOGY | Facility: CLINIC | Age: 85
End: 2025-09-04
Payer: MEDICARE

## 2025-09-22 ENCOUNTER — APPOINTMENT (OUTPATIENT)
Dept: OPHTHALMOLOGY | Age: 85
End: 2025-09-22
Payer: MEDICARE

## 2026-04-23 ENCOUNTER — APPOINTMENT (OUTPATIENT)
Dept: OPHTHALMOLOGY | Age: 86
End: 2026-04-23
Payer: MEDICARE

## (undated) DEVICE — SYRINGE, 3 CC, LUER LOCK

## (undated) DEVICE — NEEDLE, FILTER, BLUNT, 5 MIC, 18 G X 1.5 IN

## (undated) DEVICE — DRESSING, TRANSPARENT, TEGADERM, 2-3/8 X 2-3/4 IN

## (undated) DEVICE — PAD, EYE, OVAL, 1 5/8 X 2 5/8 IN, STERILE